# Patient Record
Sex: FEMALE | Race: BLACK OR AFRICAN AMERICAN | Employment: OTHER | URBAN - METROPOLITAN AREA
[De-identification: names, ages, dates, MRNs, and addresses within clinical notes are randomized per-mention and may not be internally consistent; named-entity substitution may affect disease eponyms.]

---

## 2017-01-25 PROBLEM — E78.5 HYPERLIPIDEMIA: Chronic | Status: ACTIVE | Noted: 2017-01-25

## 2017-01-25 PROBLEM — E03.9 ACQUIRED HYPOTHYROIDISM: Chronic | Status: ACTIVE | Noted: 2017-01-25

## 2017-01-25 PROBLEM — M65.341 TRIGGER RING FINGER OF RIGHT HAND: Chronic | Status: ACTIVE | Noted: 2017-01-25

## 2017-01-25 RX ORDER — SODIUM CHLORIDE, SODIUM LACTATE, POTASSIUM CHLORIDE, CALCIUM CHLORIDE 600; 310; 30; 20 MG/100ML; MG/100ML; MG/100ML; MG/100ML
125 INJECTION, SOLUTION INTRAVENOUS CONTINUOUS
Status: CANCELLED | OUTPATIENT
Start: 2017-01-25 | End: 2017-01-26

## 2017-01-25 RX ORDER — SODIUM CHLORIDE 0.9 % (FLUSH) 0.9 %
5-10 SYRINGE (ML) INJECTION AS NEEDED
Status: CANCELLED | OUTPATIENT
Start: 2017-01-25

## 2017-01-25 RX ORDER — SODIUM CHLORIDE 0.9 % (FLUSH) 0.9 %
5-10 SYRINGE (ML) INJECTION EVERY 8 HOURS
Status: CANCELLED | OUTPATIENT
Start: 2017-01-25

## 2017-01-25 NOTE — H&P
History and Physical        Patient: Lawrance Phoenix               Sex: female          DOA: (Not on file)         YOB: 1950      Age:  78 y.o.        LOS:  LOS: 0 days        HPI:   Long Gates is in for followup of her right 2nd and 4th trigger fingers. She did the therapy. She has noticed that it helped some but she still has clicking in the 2nd finger and the 4th finger gives her the most discomfort. AP, lateral, and oblique views of the right hand were obtained and interpreted in the office and show mild DJD. Otherwise, x-rays reveal no periosteal reaction, no medullary lesions, no osteopenia, well-aligned joint spaces, no chondrolysis, and no fractures. Past Medical History   Diagnosis Date    Arthritis     GERD (gastroesophageal reflux disease)     H/O ehrlichiosis 0926    Thyroid disease      hypo       Past Surgical History   Procedure Laterality Date    Hx knee arthroscopy Bilateral     Hx orthopaedic Right      3rd finger trigger finger release    Hx  section      Hx tubal ligation         No family history on file. Social History     Social History    Marital status:      Spouse name: N/A    Number of children: N/A    Years of education: N/A     Social History Main Topics    Smoking status: Not on file    Smokeless tobacco: Not on file    Alcohol use Not on file    Drug use: Not on file    Sexual activity: Not on file     Other Topics Concern    Not on file     Social History Narrative    No narrative on file       Prior to Admission medications    Medication Sig Start Date End Date Taking? Authorizing Provider   ibuprofen (MOTRIN) 200 mg tablet Take 400 mg by mouth every six (6) hours as needed for Pain. Historical Provider   acetaminophen (TYLENOL EXTRA STRENGTH) 500 mg tablet Take 1,000 mg by mouth every six (6) hours as needed for Pain. Historical Provider   multivitamin (ONE A DAY) tablet Take 1 Tab by mouth daily. Historical Provider   CALCIUM CARBONATE/VITAMIN D3 (CALCIUM 600 + D,3, PO) Take 1 Tab by mouth daily. Historical Provider   cholecalciferol (VITAMIN D3) 1,000 unit tablet Take 1,000 Units by mouth daily. Historical Provider   levothyroxine (SYNTHROID) 75 mcg tablet Take 75 mcg by mouth. Sunday, Monday, Tuesday, Thursday, Friday and Saturday    Historical Provider   levothyroxine (SYNTHROID) 75 mcg tablet Take 37.5 mcg by mouth every Wednesday. Historical Provider   rosuvastatin (CRESTOR) 10 mg tablet Take 10 mg by mouth every Monday, Wednesday, Friday. Historical Provider   Omeprazole delayed release (PRILOSEC D/R) 20 mg tablet Take 20 mg by mouth daily as needed. Historical Provider       No Known Allergies    Review of Systems  A comprehensive review of systems was negative except for that written in the History of Present Illness. Physical Exam:      There were no vitals taken for this visit. Physical Exam:  On exam the right hand has pain at the 2nd and 4th finger A1 pulley and there is obvious locking of both fingers. Her 3rd finger Dr. Teofilo Gonzales did a release on before and that is moving normally. Physical examination shows that the patients right wrist demonstrates no obvious swelling, ecchymosis, or wounds noted. The patient has normal motion in all six directions. The patient has a negative Tinel, Phalen, and direct carpal compression tests. The patient has a negative Finkelstein maneuver. The patient has a 2+ radial pulse. The patient has good  strength of the hand with normal thenar eminence tone and normal light-touch sensation at the tip of each digit. Assessment/Plan     Principal Problem:    Trigger ring finger of right hand (1/25/2017)    Active Problems:    Hyperlipidemia (1/25/2017)      Acquired hypothyroidism (1/25/2017)        Dr. Teofilo Gonzales discussed her trigger fingers at length.   He talked about surgical release at some point because he has already done her right 3rd finger. Her 4th finger is the worst and he told her we can do that whenever she likes. She is going to give this consideration and call back when she is ready to do this. Once her 4th finger is better we will discuss surgery on her 2nd finger as appropriate. We could do this on a Thursday and she could return to work on a Monday. He has not given her any pain medicine. She will call [facility]' surgical scheduler at the appropriate interval to get this set up.

## 2017-01-26 ENCOUNTER — HOSPITAL ENCOUNTER (OUTPATIENT)
Age: 67
Setting detail: OUTPATIENT SURGERY
Discharge: HOME | End: 2017-01-26
Attending: ORTHOPAEDIC SURGERY | Admitting: ORTHOPAEDIC SURGERY
Payer: MEDICARE

## 2017-01-26 ENCOUNTER — SURGERY (OUTPATIENT)
Age: 67
End: 2017-01-26

## 2017-01-26 VITALS
OXYGEN SATURATION: 100 % | DIASTOLIC BLOOD PRESSURE: 62 MMHG | TEMPERATURE: 98.4 F | WEIGHT: 167.13 LBS | HEART RATE: 63 BPM | BODY MASS INDEX: 27.85 KG/M2 | HEIGHT: 65 IN | RESPIRATION RATE: 16 BRPM | SYSTOLIC BLOOD PRESSURE: 125 MMHG

## 2017-01-26 PROCEDURE — 77030020782 HC GWN BAIR PAWS FLX 3M -B: Performed by: ORTHOPAEDIC SURGERY

## 2017-01-26 PROCEDURE — 76010000154 HC OR TIME FIRST 0.5 HR: Performed by: ORTHOPAEDIC SURGERY

## 2017-01-26 PROCEDURE — 74011000250 HC RX REV CODE- 250: Performed by: ORTHOPAEDIC SURGERY

## 2017-01-26 PROCEDURE — 77030011640 HC PAD GRND REM COVD -A: Performed by: ORTHOPAEDIC SURGERY

## 2017-01-26 PROCEDURE — 76210000021 HC REC RM PH II 0.5 TO 1 HR: Performed by: ORTHOPAEDIC SURGERY

## 2017-01-26 RX ORDER — DIPHENHYDRAMINE HCL 25 MG
25 CAPSULE ORAL
Status: DISCONTINUED | OUTPATIENT
Start: 2017-01-26 | End: 2017-01-26 | Stop reason: HOSPADM

## 2017-01-26 RX ORDER — SODIUM CHLORIDE 0.9 % (FLUSH) 0.9 %
5-10 SYRINGE (ML) INJECTION AS NEEDED
Status: DISCONTINUED | OUTPATIENT
Start: 2017-01-26 | End: 2017-01-26 | Stop reason: HOSPADM

## 2017-01-26 RX ORDER — SODIUM CHLORIDE 0.9 % (FLUSH) 0.9 %
5-10 SYRINGE (ML) INJECTION EVERY 8 HOURS
Status: DISCONTINUED | OUTPATIENT
Start: 2017-01-26 | End: 2017-01-26 | Stop reason: HOSPADM

## 2017-01-26 RX ORDER — HYDROCODONE BITARTRATE AND ACETAMINOPHEN 5; 325 MG/1; MG/1
1-2 TABLET ORAL
Qty: 30 TAB | Refills: 0 | Status: SHIPPED | OUTPATIENT
Start: 2017-01-26 | End: 2021-10-07

## 2017-01-26 RX ORDER — BUPIVACAINE HYDROCHLORIDE 2.5 MG/ML
INJECTION, SOLUTION EPIDURAL; INFILTRATION; INTRACAUDAL AS NEEDED
Status: DISCONTINUED | OUTPATIENT
Start: 2017-01-26 | End: 2017-01-26 | Stop reason: HOSPADM

## 2017-01-26 RX ADMIN — BUPIVACAINE HYDROCHLORIDE 8 ML: 2.5 INJECTION, SOLUTION EPIDURAL; INFILTRATION; INTRACAUDAL; PERINEURAL at 11:56

## 2017-01-26 NOTE — DISCHARGE INSTRUCTIONS
DISCHARGE SUMMARY from Nurse    The following personal items are in your possession at time of discharge:    Dental Appliances: None  Visual Aid: Glasses, Sent home     Home Medications: None  Jewelry: Earrings (with Concepcion Billet)  Clothing: Pants, Shirt, Undergarments, Footwear, Socks, Jacket/Coat (in locker 4)  Other Valuables: Eyeglasses, Cell Phone (with Concepcion Billet)             PATIENT INSTRUCTIONS:    After general anesthesia or intravenous sedation, for 24 hours or while taking prescription Narcotics:  · Limit your activities  · Do not drive and operate hazardous machinery  · Do not make important personal or business decisions  · Do  not drink alcoholic beverages  · If you have not urinated within 8 hours after discharge, please contact your surgeon on call. Report the following to your surgeon:  · Excessive pain, swelling, redness or odor of or around the surgical area  · Temperature over 100.5  · Nausea and vomiting lasting longer than 4 hours or if unable to take medications  · Any signs of decreased circulation or nerve impairment to extremity: change in color, persistent  numbness, tingling, coldness or increase pain  · Any questions        What to do at Home:  Recommended activity: Activity as tolerated, no lifting with operative hand, follow Dr. Vane Mendez instructions in this packet    If you experience any of the following symptoms temperature above 101.0, pain not controlled by medication, numbness or tingling of hand please follow up with Dr. Vane Mendez. *  Please give a list of your current medications to your Primary Care Provider. *  Please update this list whenever your medications are discontinued, doses are      changed, or new medications (including over-the-counter products) are added. *  Please carry medication information at all times in case of emergency situations.           These are general instructions for a healthy lifestyle:    No smoking/ No tobacco products/ Avoid exposure to second hand smoke    Surgeon General's Warning:  Quitting smoking now greatly reduces serious risk to your health. Obesity, smoking, and sedentary lifestyle greatly increases your risk for illness    A healthy diet, regular physical exercise & weight monitoring are important for maintaining a healthy lifestyle    You may be retaining fluid if you have a history of heart failure or if you experience any of the following symptoms:  Weight gain of 3 pounds or more overnight or 5 pounds in a week, increased swelling in our hands or feet or shortness of breath while lying flat in bed. Please call your doctor as soon as you notice any of these symptoms; do not wait until your next office visit. Recognize signs and symptoms of STROKE:    F-face looks uneven    A-arms unable to move or move unevenly    S-speech slurred or non-existent    T-time-call 911 as soon as signs and symptoms begin-DO NOT go       Back to bed or wait to see if you get better-TIME IS BRAIN. Warning Signs of HEART ATTACK     Call 911 if you have these symptoms:   Chest discomfort. Most heart attacks involve discomfort in the center of the chest that lasts more than a few minutes, or that goes away and comes back. It can feel like uncomfortable pressure, squeezing, fullness, or pain.  Discomfort in other areas of the upper body. Symptoms can include pain or discomfort in one or both arms, the back, neck, jaw, or stomach.  Shortness of breath with or without chest discomfort.  Other signs may include breaking out in a cold sweat, nausea, or lightheadedness. Don't wait more than five minutes to call 911 - MINUTES MATTER! Fast action can save your life. Calling 911 is almost always the fastest way to get lifesaving treatment. Emergency Medical Services staff can begin treatment when they arrive -- up to an hour sooner than if someone gets to the hospital by car. The discharge information has been reviewed with the patient and caregiver. The patient and caregiver verbalized understanding. Discharge medications reviewed with the patient and caregiver and appropriate educational materials and side effects teaching were provided. Patient armband removed and shredded                Odin Melendez III, MD Marye Coombe, PA-C    Upper Extremity Surgery  Discharge Instructions      Please take the time to review the following instructions before you leave the hospital and use them as guidelines during your recovery from surgery. If you have any questions you may contact my office at [phone]. Wound Care/Dressing Changes:    []   You may remove the bulky dressing two days after surgery. Once you remove this, no dressing is necessary if there is no drainage. [x]   You may change your dressing as needed. Beginning the 2 days after you are discharged from the hospital you should change your dressing daily. A big, bulky dressing isnt necessary as long as there is any drainage from the incisions. You can put a band-aid or a piece of gauze over each incision and wear an ACE bandage as needed for comfort and swelling. []   Dont remove your dressing or get them wet.  It isnt necessary to apply antibiotic ointment to your incisions. Sutures will be removed at your one week post-op visit. Staples (if you have them) are removed in two weeks. If you have steri-strips over your incision they will start to peel off in 7-10 days as you get them wet. They dont need to be removed prior to that. When they begin to peel off, you may remove them. They should all be removed by 14 days from your surgery. Showering/Bathing:    [x]   You may shower 2 days after your surgery. Your dressing may be removed for showering. You may get your incisions wet in the shower. Dont vigorously scrub your incisions. Apply a clean, dry dressing after you have dried your incisions.   Do not take a bath or get into a swimming pool or Jacuzzi until the incisions are completely healed. This may take about 14 days. Do not soak your incision under water. Sling:    []   You are not required to wear your sling and should do so only as needed for comfort. You have no restrictions with regards to the movement of your shoulder. Please push to achieve full range of motion as soon as possible. You may resume your normal daily activities immediately and return to work as soon as you feel appropriate.    []   Keep your arm in the immobilizer at all times except when showering and changing your clothes. When showering or changing, keep your arm at your side. Dont move it away from your body. []   Keep your arm in the immobilizer at all times except when showering, changing your clothes and doing the exercises shown to you by Dr. Teofilo Gonzales or your physical therapist prior to your discharge from the hospital.  Keep your arm at your side when changing your clothes and showering. Dont move it away from your body. Ice/Elevation    Continue ice consistently for 48 hours after surgery. After 48 hours, you should ice your shoulder 3 times per day, for 20 minutes at a time for the next 5 days. After one week from surgery, you may use ice as needed for pain and swelling. Diet:    You may advance to your regular diet as tolerated. Medication:    1. You will be given a prescription for pain medication when you are discharged from the hospital.  Take the medication as needed according to the directions on the prescription bottle. Possible side effects of the medication include dizziness, headache, nausea, vomiting, constipation and urinary retention. If you experience any of these side effects call the office so that we can assist you in relieving them. Discontinue the use of the pain medication if you develop itching, rash, shortness of breath or difficulties swallowing.   If these symptoms become severe or arent relieved by discontinuing the medication you should seek immediate medical attention. Refills of pain medication are authorized during office hours only (8 AM-5PM Mon. thru Fri.). 2. If you were prescribed Percoset/oxycodone or Dilaudid/hydromorphone you must have a written prescription. These medications legally cannot be called in to the pharmacy. 3. You may take over the counter Ibuprofen/Advil/Aleve between dosages of your pain medication if needed. Do not take Tylenol in addition to your pain medication as most of the pain medication already contains Tylenol. Do not exceed 3000mg of Tylenol per day. Ex: (hydrocodone 5/325g= 325mg of Tylenol)  4. You may resume the medication you were taking prior to surgery. Pain medication may change the effects of any antidepressant medication you are taking. If you have any questions about possible interactions between your regular medications and the pain medication you should consult the physician who prescribes your regular medications. Follow Up Appointment:  If you are unsure of your follow-up appointment date and time, please call [phone]. Please let our  know you are scheduling a post-op appointment. Most appointments should be between 7-14 days after your surgery. Physical Therapy:    []    If you already have a therapy appointment, please be sure to attend your sessions as scheduled. []   Physical Therapy will be discussed with you at your first follow-up appointment with Dr. Ramiro Lujan. You dont need to begin physical therapy prior to that.    []  Begin physical therapy with your Home Health Physical Therapy. This will be set up         for your before you leave the hospital.    [x]  You do not require Physical Therapy. Important Signs and Symptoms:    If any of the following signs and symptoms occurs, you should contact Dr. Ramiro Lujan office.   Please be advised if a problem arises which you feel requires immediate medical attention or you are unable to contact Dr. Ramiro Lujan office you should seek immediate medical attention. Signs and symptoms to watch for include:    1. A sudden increase in swelling and/or redness or warmth at the area your surgery was performed which isnt relieved by rest, ice, and elevation. 2. Oral temperature greater than 101 degrees for 12 hours or more which isnt relieved by an increase in fluid intake and taking two Tylenol every 4-6 hours. 3. Excessive drainage from your incisions, or drainage which hasnt stopped by 72 hours after your surgery. 4. Fever, chills, shortness of breath, chest pain, nausea, vomiting or other signs and symptoms which are of concern to you.

## 2017-01-26 NOTE — INTERVAL H&P NOTE
H&P Update:  Alejo Castillo was seen and examined. History and physical has been reviewed. The patient has been examined. There have been no significant clinical changes since the completion of the originally dated History and Physical.  Patient identified by surgeon; surgical site was confirmed by patient and surgeon.     Signed By: Ashley Schultz MD     January 26, 2017 11:16 AM

## 2017-01-26 NOTE — OP NOTES
TRIGGER FINGER RELEASE OPERATIVE NOTE    Patient: Robbin Shone MRN: [id number]  SSN: [id number]    YOB: 1950  Age: 78 y.o. Sex: female          Date of Procedure: 1/26/2017     Preoperative Diagnosis: TRIGGER FINGER    Postoperative Diagnosis: TRIGGER FINGER      Procedure: Procedure(s):  RIGHT 4TH TRIGGER FINGER RELEASE **LOCAL DO NOT START IV**    Surgeon:  Surgeon(s) and Role:     * Keith Villanueva MD - Primary    Anesthesia: Local    Estimated Blood Loss: Minimal     Tourniquet Time:   Approximately 8 minutes at 250mm Hg. Specimens: None    Complications: None    Indications: This is a 78y.o. year-old female who presents with the surgically consented known trigger finger(s) and  who now presents for surgical correction due to inability to correct the patients problems conservatively with cortisone injections. Procedure: The patient was brought in the operating theater and after adequate anesthesia the correct hand/fingers  was prepped and draped in the typical sterile fashion. The limb was exsanguinated with an Esmarch bandage and the tourniquet was inflated to 250mm Hg. The surgically consented trigger finger(s) A1 pulley was anesthetized with approximately 5 mL 0.25% Marcaine plain. The curvilinear incision was then made in the flexion crease, centering it over the flexor tendon. The incision was taken down to the sheath, which was identified and then the A1 pulley was released from proximal to distal under direct visualization. Once it was released completely, the finger could be flexed and extended without locking. The skin was then closed with two horizontal 4-0 nylons and dressed with 4 x 4s and an Ace wrap and the patient returned to the recovery room awake, in stable condition. All instrument, sponge and needle counts were correct. This was the same procedure for each trigger finger as consented.             Keith Villanueva MD  1/26/2017  12:04 PM

## 2017-01-26 NOTE — IP AVS SNAPSHOT
[address] [place] [id number] Patient: Kait De La Cruz MRN: [id number] YTF:81/28/2013 You are allergic to the following No active allergies Recent Documentation Height Weight BMI OB Status Smoking Status 1.651 m 75.8 kg 27.81 kg/m2 Hysterectomy Unknown If Ever Smoked Emergency Contacts Name Discharge Info Relation Home Work Mobile Fonda Goldmann  Spouse [3]   [phone] About your hospitalization You were admitted on:  January 26, 2017 [facility] You were discharged on:  January 26, 2017 Unit phone number:    
  
Why you were hospitalized Your primary diagnosis was:  Trigger Ring Finger Of Right Hand Your diagnoses also included:  Hyperlipidemia, Acquired Hypothyroidism Providers Seen During Your Hospitalizations Provider Role Specialty Primary office phone James Castillo MD Attending Provider Orthopedic Surgery [phone] Your Primary Care Physician (PCP) Primary Care Physician Office Phone Office Fax Barbra Adlersherrie [phone] Follow-up Information Follow up With Details Comments Contact Info Chaitanya Farfan MD   [address] [place] [address] 
[phone] James Castillo MD Follow up in 10 day(s)  [address] [address] 
[phone] Current Discharge Medication List  
  
START taking these medications Dose & Instructions Dispensing Information Comments Morning Noon Evening Bedtime HYDROcodone-acetaminophen 5-325 mg per tablet Commonly known as:  Thenatalie [place] Your next dose is: Today, Tomorrow Other:  _________ Dose:  1-2 Tab Take 1-2 Tabs by mouth every four (4) hours as needed for Pain. Max Daily Amount: 12 Tabs. Quantity:  30 Tab Refills:  0 CONTINUE these medications which have NOT CHANGED Dose & Instructions Dispensing Information Comments Morning Noon Evening Bedtime CALCIUM 600 + D(3) PO Your next dose is: Today, Tomorrow Other:  _________ Dose:  1 Tab Take 1 Tab by mouth daily. Refills:  0  
     
   
   
   
  
 CRESTOR 10 mg tablet Generic drug:  rosuvastatin Your next dose is: Today, Tomorrow Other:  _________ Dose:  10 mg Take 10 mg by mouth every Monday, Wednesday, Friday. Refills:  0  
     
   
   
   
  
 ibuprofen 200 mg tablet Commonly known as:  MOTRIN Your next dose is: Today, Tomorrow Other:  _________ Dose:  400 mg Take 400 mg by mouth every six (6) hours as needed for Pain. Refills:  0  
     
   
   
   
  
 multivitamin tablet Commonly known as:  ONE A DAY Your next dose is: Today, Tomorrow Other:  _________ Dose:  1 Tab Take 1 Tab by mouth daily. Refills:  0 Omeprazole delayed release 20 mg tablet Commonly known as:  PRILOSEC D/R Your next dose is: Today, Tomorrow Other:  _________ Dose:  20 mg Take 20 mg by mouth daily as needed. Refills:  0  
     
   
   
   
  
 * SYNTHROID 75 mcg tablet Generic drug:  levothyroxine Your next dose is: Today, Tomorrow Other:  _________ Dose:  75 mcg Take 75 mcg by mouth. Sunday, Monday, Tuesday, Thursday, Friday and Saturday Refills:  0  
     
   
   
   
  
 * SYNTHROID 75 mcg tablet Generic drug:  levothyroxine Your next dose is: Today, Tomorrow Other:  _________ Dose:  37.5 mcg Take 37.5 mcg by mouth every Wednesday. Refills:  0  
     
   
   
   
  
 VITAMIN D3 1,000 unit tablet Generic drug:  cholecalciferol Your next dose is: Today, Tomorrow Other:  _________ Dose:  1000 Units Take 1,000 Units by mouth daily. Refills:  0  
     
   
   
   
  
 * Notice: This list has 2 medication(s) that are the same as other medications prescribed for you. Read the directions carefully, and ask your doctor or other care provider to review them with you. STOP taking these medications TYLENOL EXTRA STRENGTH 500 mg tablet Generic drug:  acetaminophen Where to Get Your Medications Information on where to get these meds will be given to you by the nurse or doctor. ! Ask your nurse or doctor about these medications HYDROcodone-acetaminophen 5-325 mg per tablet Discharge Instructions DISCHARGE SUMMARY from Nurse The following personal items are in your possession at time of discharge: 
 
Dental Appliances: None Visual Aid: Glasses, Sent home Home Medications: None Jewelry: Earrings (with Adryan Slack) Clothing: Pants, Shirt, Undergarments, Footwear, Socks, Jacket/Coat (in locker 4) Other Valuables: Eyeglasses, Cell Phone (with Adryan Slack) PATIENT INSTRUCTIONS: 
 
 
F-face looks uneven A-arms unable to move or move unevenly S-speech slurred or non-existent T-time-call 911 as soon as signs and symptoms begin-DO NOT go Back to bed or wait to see if you get better-TIME IS BRAIN. Warning Signs of HEART ATTACK Call 911 if you have these symptoms: 
? Chest discomfort. Most heart attacks involve discomfort in the center of the chest that lasts more than a few minutes, or that goes away and comes back. It can feel like uncomfortable pressure, squeezing, fullness, or pain. ? Discomfort in other areas of the upper body. Symptoms can include pain or discomfort in one or both arms, the back, neck, jaw, or stomach. ? Shortness of breath with or without chest discomfort. ? Other signs may include breaking out in a cold sweat, nausea, or lightheadedness. Don't wait more than five minutes to call [address]! Fast action can save your life. Calling 911 is almost always the fastest way to get lifesaving treatment. Emergency Medical Services staff can begin treatment when they arrive  up to an hour sooner than if someone gets to the hospital by car. The discharge information has been reviewed with the patient and caregiver. The patient and caregiver verbalized understanding. Discharge medications reviewed with the patient and caregiver and appropriate educational materials and side effects teaching were provided. Patient armband removed and shredded Nai Muse III, MD Fermin Bast, PA-C Upper Extremity Surgery Discharge Instructions Please take the time to review the following instructions before you leave the hospital and use them as guidelines during your recovery from surgery. If you have any questions you may contact my office at [phone]. Wound Care/Dressing Changes: You may remove the bulky dressing two days after surgery. Once you remove this, no dressing is necessary if there is no drainage. You may change your dressing as needed. Beginning the 2 days after you are discharged from the hospital you should change your dressing daily. A big, bulky dressing isnt necessary as long as there is any drainage from the incisions. You can put a band-aid or a piece of gauze over each incision and wear an ACE bandage as needed for comfort and swelling. Dont remove your dressing or get them wet. ? It isnt necessary to apply antibiotic ointment to your incisions. Sutures will be removed at your one week post-op visit. Staples (if you have them) are removed in two weeks. If you have steri-strips over your incision they will start to peel off in 7-10 days as you get them wet. They dont need to be removed prior to that. When they begin to peel off, you may remove them. They should all be removed by 14 days from your surgery. Showering/Bathing: You may shower 2 days after your surgery. Your dressing may be removed for showering. You may get your incisions wet in the shower. Dont vigorously scrub your incisions. Apply a clean, dry dressing after you have dried your incisions. Do not take a bath or get into a swimming pool or Jacuzzi until the incisions are completely healed. This may take about 14 days. Do not soak your incision under water. Sling: You are not required to wear your sling and should do so only as needed for comfort. You have no restrictions with regards to the movement of your shoulder. Please push to achieve full range of motion as soon as possible. You may resume your normal daily activities immediately and return to work as soon as you feel appropriate. Keep your arm in the immobilizer at all times except when showering and changing your clothes. When showering or changing, keep your arm at your side. Dont move it away from your body. Keep your arm in the immobilizer at all times except when showering, changing your clothes and doing the exercises shown to you by Dr. Surinder Mccoy or your physical therapist prior to your discharge from the hospital.  Keep your arm at your side when changing your clothes and showering. Dont move it away from your body. Ice/Elevation Continue ice consistently for 48 hours after surgery. After 48 hours, you should ice your shoulder 3 times per day, for 20 minutes at a time for the next 5 days. After one week from surgery, you may use ice as needed for pain and swelling. Diet: 
 
You may advance to your regular diet as tolerated. Medication: 
 
1. You will be given a prescription for pain medication when you are discharged from the hospital.  Take the medication as needed according to the directions on the prescription bottle. Possible side effects of the medication include dizziness, headache, nausea, vomiting, constipation and urinary retention. If you experience any of these side effects call the office so that we can assist you in relieving them. Discontinue the use of the pain medication if you develop itching, rash, shortness of breath or difficulties swallowing. If these symptoms become severe or arent relieved by discontinuing the medication you should seek immediate medical attention. Refills of pain medication are authorized during office hours only (8 AM-5PM Mon. thru Fri.). 2. If you were prescribed Percoset/oxycodone or Dilaudid/hydromorphone you must have a written prescription. These medications legally cannot be called in to the pharmacy. 3. You may take over the counter Ibuprofen/Advil/Aleve between dosages of your pain medication if needed. Do not take Tylenol in addition to your pain medication as most of the pain medication already contains Tylenol. Do not exceed 3000mg of Tylenol per day. Ex: (hydrocodone 5/325g= 325mg of Tylenol) 4. You may resume the medication you were taking prior to surgery. Pain medication may change the effects of any antidepressant medication you are taking. If you have any questions about possible interactions between your regular medications and the pain medication you should consult the physician who prescribes your regular medications. Follow Up Appointment: If you are unsure of your follow-up appointment date and time, please call [phone]. Please let our  know you are scheduling a post-op appointment. Most appointments should be between 7-14 days after your surgery. Physical Therapy: If you already have a therapy appointment, please be sure to attend your sessions as scheduled. Physical Therapy will be discussed with you at your first follow-up appointment with Dr. Mira Can. You dont need to begin physical therapy prior to that. Begin physical therapy with your Home Health Physical Therapy. This will be set up         for your before you leave the hospital. 
 
  You do not require Physical Therapy. Important Signs and Symptoms: 
 
If any of the following signs and symptoms occurs, you should contact Dr. Mira Can office. Please be advised if a problem arises which you feel requires immediate medical attention or you are unable to contact Dr. Mira Can office you should seek immediate medical attention. Signs and symptoms to watch for include: 1. A sudden increase in swelling and/or redness or warmth at the area your surgery was performed which isnt relieved by rest, ice, and elevation. 2. Oral temperature greater than 101 degrees for 12 hours or more which isnt relieved by an increase in fluid intake and taking two Tylenol every 4-6 hours. 3. Excessive drainage from your incisions, or drainage which hasnt stopped by 72 hours after your surgery. 4. Fever, chills, shortness of breath, chest pain, nausea, vomiting or other signs and symptoms which are of concern to you. Discharge Orders None Introducing [facility]! Prosper Martínez introduces [facility] patient portal. Now you can access parts of your medical record, email your doctor's office, and request medication refills online. 1. In your internet browser, go to [email or web address] 2. Click on the First Time User? Click Here link in the Sign In box. You will see the New Member Sign Up page. 3. Enter your [facility] Access Code exactly as it appears below. You will not need to use this code after youve completed the sign-up process.  If you do not sign up before the expiration date, you must request a new code. · [employer] Access Code: [email or web address] Expires: 4/6/2017  6:34 PM 
 
4. Enter the last four digits of your Social Security Number (xxxx) and Date of Birth (mm/dd/yyyy) as indicated and click Submit. You will be taken to the next sign-up page. 5. Create a [employer] ID. This will be your [employer] login ID and cannot be changed, so think of one that is secure and easy to remember. 6. Create a [facility] password. You can change your password at any time. 7. Enter your Password Reset Question and Answer. This can be used at a later time if you forget your password. 8. Enter your e-mail address. You will receive e-mail notification when new information is available in [address] 9. Click Sign Up. You can now view and download portions of your medical record. 10. Click the Download Summary menu link to download a portable copy of your medical information. If you have questions, please visit the Frequently Asked Questions section of the [employer] website. Remember, [employer] is NOT to be used for urgent needs. For medical emergencies, dial 911. Now available from your iPhone and Android! General Information Please provide this summary of care documentation to your next provider. Patient Signature:  ____________________________________________________________ Date:  ____________________________________________________________  
  
Martínez Laughlin Provider Signature:  ____________________________________________________________ Date:  ____________________________________________________________

## 2017-06-19 PROBLEM — M65.321 TRIGGER INDEX FINGER OF RIGHT HAND: Chronic | Status: ACTIVE | Noted: 2017-06-19

## 2020-07-07 NOTE — PROGRESS NOTES
[facility]  [address] [place] [address], [address], [address] [facility]  [address] [facility], [address] [place] [address]  [place], [address]   [phone]  Cathlyn Fothergill DO      Patient ID:  Name:  Ailyn Luna  MRN:  [id number]  :  1950/69 y.o. Date:  2020      HISTORY OF PRESENT ILLNESS:  Ailyn Luna is a 78 y.o.   postmenopausal female referred by Dr. Scott Montero for persistent left hydrosalpinx. Pt has been followed for persistent hydrosalpinx. Pt initially had pelvic pain leading to ultrasound. Has been being followed by ultrasound since 2018. She presents today for further management. Currently, Denies Vaginal bleeding, change in vaginal discharge, new abdominopelvic pain, change in bladder/bowel habits          Labs:  : per patient was normal      Imaging:  Ultrasound left fallopian tube  2020: 1.8 x1.2 x 2.0 cm  2019: 1.6 x0.9 x1.4 cm  2018: 1.7 x0.9 x 1.5 cm       ROS:   As above      There are no active problems to display for this patient. History reviewed. No pertinent past medical history. Past Surgical History:   Procedure Laterality Date    HX GYN      C Section     HX GYN      tubal ligation     HX ORTHOPAEDIC      Ligament surgery     HX ORTHOPAEDIC      finger surgery       OB History        2    Para   2    Term                AB        Living   2       SAB        TAB        Ectopic        Molar        Multiple        Live Births              Obstetric Comments   2 C sections            Social History     Tobacco Use    Smoking status: Former Smoker     Last attempt to quit:      Years since quittin.5    Smokeless tobacco: Never Used   Substance Use Topics    Alcohol use:  Yes     Alcohol/week: 1.0 standard drinks     Types: 1 Glasses of wine per week      Family History   Problem Relation Age of Onset    Cancer Brother         Prostate      Current Outpatient Medications   Medication Sig    Synthroid 75 mcg tablet TAKE ONE TABLET BY MOUTH ONCE DAILY EXCEPT ON WEDNESDAY AND SATURDAY TAKE HALF A TABLET    rosuvastatin (CRESTOR) 10 mg tablet TAKE 1 TABLET BY MOUTH 4 TIMES A WEEK AT BEDTIME    omeprazole (PriLOSEC OTC) 20 mg tablet Take 20 mg by mouth daily.  naproxen (NAPROSYN) 500 mg tablet TK 1 T PO  BID PRN FOR PAIN     No current facility-administered medications for this visit. Allergies   Allergen Reactions    Tramadol Nausea and Vomiting          OBJECTIVE:    Physical Exam  VITAL SIGNS: Visit Vitals  /85 (BP 1 Location: Right arm, BP Patient Position: Sitting)   Pulse 73   Temp 96.9 °F (36.1 °C) (Oral)   Resp 14   Ht 5' 5\" (1.651 m)   Wt 73 kg (161 lb)   SpO2 97%   BMI 26.79 kg/m²      GENERAL FAYE: in no apparent distress and well developed and well nourished   MUSCULOSKEL: no joint tenderness, deformity or swelling   INTEGUMENT:  warm and dry, no rashes or lesions   ABDOMEN . soft, NT, ND, No masses appreciated   EXTREMITIES: extremities normal, atraumatic, no cyanosis or edema   PELVIC: External genitalia: normal general appearance  Vaginal: atrophic mucosa  Cervix: normal appearance  Adnexa: fullness in left adnexa  Uterus: normal single, nontender   RECTAL: deferred   AGUSTIN SURVEY: Cervical, supraclavicular, axillary and inguinal nodes normal.   NEURO: Grossly normal         IMPRESSION/PLAN:  1. Persistent left hydrosalpinx   -reviewed imaging and essentially stable. Can not say with 554% certainty that not a cancerous process but given minimal change it is unlikely   -discussed definitive diagnosis would involve surgery including laparoscopic bso.    -pt would like proceed with surveillance.  Will plan for repeat ultrasound and f/u in 1 year   -pt to call sooner if pain comes back     The total time spent was 60 minutes regarding this patients diagnosis of hydrosalpinx and >50% of this time was spent counseling and coordinating care    [facility]  7/24/20202:59 PM

## 2020-07-24 ENCOUNTER — OFFICE VISIT (OUTPATIENT)
Dept: ONCOLOGY | Age: 70
End: 2020-07-24

## 2020-07-24 VITALS
SYSTOLIC BLOOD PRESSURE: 140 MMHG | WEIGHT: 161 LBS | OXYGEN SATURATION: 97 % | HEART RATE: 73 BPM | BODY MASS INDEX: 26.82 KG/M2 | RESPIRATION RATE: 14 BRPM | HEIGHT: 65 IN | TEMPERATURE: 96.9 F | DIASTOLIC BLOOD PRESSURE: 85 MMHG

## 2020-07-24 DIAGNOSIS — N70.11 HYDROSALPINX: Primary | ICD-10-CM

## 2020-07-24 RX ORDER — LEVOTHYROXINE SODIUM 75 UG/1
TABLET ORAL
COMMUNITY
Start: 2020-06-23 | End: 2021-10-07

## 2020-07-24 RX ORDER — NAPROXEN 500 MG/1
TABLET ORAL
COMMUNITY
Start: 2020-06-08 | End: 2021-10-07

## 2020-07-24 RX ORDER — ROSUVASTATIN CALCIUM 10 MG/1
10 TABLET, COATED ORAL
COMMUNITY
Start: 2020-06-20

## 2020-07-24 RX ORDER — OMEPRAZOLE 20 MG/1
20 TABLET, DELAYED RELEASE ORAL DAILY
COMMUNITY

## 2020-07-24 NOTE — PROGRESS NOTES
Harrison Alcantar is a 78 y.o. female presents in office for Dr. Referred by Dr. Song Davis    Chief Complaint   Patient presents with   [facility] New Patient     referred by Dr. Abisai Jain      Patient states has had a  test that came back negative. Patient states has a Hx of fluid in fallopian tube on left side. Patient states has occasional cramping. Patient states have Hx of tubal ligation. Patient states has Hx of 2 pregnancies delivery by . Do you have any unusual vaginal bleeding, discharge or irritation? No     Do you have any changes in your bowel movements? No     Have you been experiencing any continuous or worsening abdominal pain? No     .  Visit Vitals  /85 (BP 1 Location: Right arm, BP Patient Position: Sitting)   Pulse 73   Temp 96.9 °F (36.1 °C) (Oral)   Resp 14   Ht 5' 5\" (1.651 m)   Wt 161 lb (73 kg)   SpO2 97%   BMI 26.79 kg/m²         Health Maintenance Due   Topic Date Due    Hepatitis C Screening  1950    Lipid Screen  11/10/1959    DTaP/Tdap/Td series (1 - Tdap) 11/10/1970    Shingrix Vaccine Age 50> (1 of 2) 11/10/1999    Breast Cancer Screen Mammogram  11/10/1999    FOBT Q1Y Age 50-75  11/10/1999    GLAUCOMA SCREENING Q2Y  11/10/2014    Bone Densitometry (Dexa) Screening  11/10/2014    Pneumococcal 65+ years (1 of 1 - PPSV23) 11/10/2014         1. Have you been to the ER, urgent care clinic since your last visit? Hospitalized since your last visit? No     2. Have you seen or consulted any other health care providers outside of the [address] [place] [address] since your last visit? Include any pap smears or colon screening. Dr. Song Davis    3 most recent [facility] Screens 2020   Little interest or pleasure in doing things Not at all   Feeling down, depressed, irritable, or hopeless Not at all   Total Score PHQ 2 0       Fall Risk Assessment, last 12 mths 2020   Able to walk? Yes   Fall in past 12 months?  No

## 2020-07-24 NOTE — LETTER
7/24/20 Patient: Josephine Jain YOB: 1950 Date of Visit: 7/24/2020 Mariana Rothman, [address] [facility] [address] VIA Facsimile: [phone] Dear Mariana Rothman DO, Thank you for referring Ms. Josephine Jain to [facility] for evaluation. My notes for this consultation are attached. If you have questions, please do not hesitate to call me. I look forward to following your patient along with you. Sincerely, Lisseth Meneses MD

## 2021-06-22 ENCOUNTER — OFFICE VISIT (OUTPATIENT)
Dept: ONCOLOGY | Age: 71
End: 2021-06-22
Payer: MEDICARE

## 2021-06-22 VITALS
TEMPERATURE: 96.7 F | HEIGHT: 65 IN | DIASTOLIC BLOOD PRESSURE: 71 MMHG | SYSTOLIC BLOOD PRESSURE: 132 MMHG | BODY MASS INDEX: 27.02 KG/M2 | OXYGEN SATURATION: 98 % | WEIGHT: 162.2 LBS | HEART RATE: 75 BPM

## 2021-06-22 DIAGNOSIS — Z01.818 PREOPERATIVE TESTING: Primary | ICD-10-CM

## 2021-06-22 DIAGNOSIS — N70.11 HYDROSALPINX: ICD-10-CM

## 2021-06-22 PROCEDURE — G8419 CALC BMI OUT NRM PARAM NOF/U: HCPCS | Performed by: OBSTETRICS & GYNECOLOGY

## 2021-06-22 PROCEDURE — 99213 OFFICE O/P EST LOW 20 MIN: CPT | Performed by: OBSTETRICS & GYNECOLOGY

## 2021-06-22 PROCEDURE — 1101F PT FALLS ASSESS-DOCD LE1/YR: CPT | Performed by: OBSTETRICS & GYNECOLOGY

## 2021-06-22 PROCEDURE — G8400 PT W/DXA NO RESULTS DOC: HCPCS | Performed by: OBSTETRICS & GYNECOLOGY

## 2021-06-22 PROCEDURE — G8536 NO DOC ELDER MAL SCRN: HCPCS | Performed by: OBSTETRICS & GYNECOLOGY

## 2021-06-22 PROCEDURE — 1090F PRES/ABSN URINE INCON ASSESS: CPT | Performed by: OBSTETRICS & GYNECOLOGY

## 2021-06-22 PROCEDURE — G8510 SCR DEP NEG, NO PLAN REQD: HCPCS | Performed by: OBSTETRICS & GYNECOLOGY

## 2021-06-22 PROCEDURE — 3017F COLORECTAL CA SCREEN DOC REV: CPT | Performed by: OBSTETRICS & GYNECOLOGY

## 2021-06-22 PROCEDURE — G8427 DOCREV CUR MEDS BY ELIG CLIN: HCPCS | Performed by: OBSTETRICS & GYNECOLOGY

## 2021-06-22 PROCEDURE — G0463 HOSPITAL OUTPT CLINIC VISIT: HCPCS | Performed by: OBSTETRICS & GYNECOLOGY

## 2021-06-22 NOTE — PROGRESS NOTES
Diaz Torres is a 69 y.o. female presents in office for 1 year f/u. Referral Dr. Paulo De Jesus. Chief Complaint   Patient presents with    Follow-up        Do you have any unusual vaginal bleeding, discharge or irritation? no  Do you have any changes in your bowel movements? no  Have you been experiencing nausea or vomiting? no  Have you been experiencing any continuous or worsening abdominal pain? no  Any urinary burning? no    Visit Vitals  /71 (BP 1 Location: Left upper arm, BP Patient Position: Sitting, BP Cuff Size: Large adult)   Pulse 75   Temp (!) 96.7 °F (35.9 °C) (Oral)   Ht 5' 5\" (1.651 m)   Wt 162 lb 3.2 oz (73.6 kg)   SpO2 98%   BMI 26.99 kg/m²         1. Have you been to the ER, urgent care clinic since your last visit? Hospitalized since your last visit? No    2. Have you seen or consulted any other health care providers outside of the [address] [place] [address] since your last visit? Include any pap smears or colon screening. Dr. Paulo De Jesus    3 most recent [facility] Screens 6/22/2021   Little interest or pleasure in doing things Not at all   Feeling down, depressed, irritable, or hopeless Not at all   Total Score PHQ 2 0       No flowsheet data found. Fall Risk Assessment, last 12 mths 6/22/2021   Able to walk? Yes   Fall in past 12 months? 0   Do you feel unsteady? 0   Are you worried about falling 0       No flowsheet data found.

## 2021-06-22 NOTE — PROGRESS NOTES
[facility] [facility] SPECIALISTS  [address] [place] [address], [address], [address] [facility]  [address] [facility], [address] [facility]  [place], [address]   [phone]  Meseret Rivers DO      Patient ID:  Name:  Jasen Castillo  MRN:  [id number]  :  1950/70 y.o. Date:  2021      HISTORY OF PRESENT ILLNESS:  Jasen Castillo is a 69 y.o.   postmenopausal female referred by Dr. Lily Bazan for persistent left hydrosalpinx. Pt has been followed for persistent hydrosalpinx. Pt initially had pelvic pain leading to ultrasound. Has been being followed by ultrasound since 2018. She presents today for further management. Currently, Denies Vaginal bleeding, change in vaginal discharge, new abdominopelvic pain, change in bladder/bowel habits. Had a repeat ultrasound and here to review. Labs:  : per patient was normal      Imaging:  Scanned in media  2021 tvus  1.8 x 1.1 x 2.4 cm; 4x4 mm solid hyperechoic area imprinted.  No vascular flow    Ultrasound left fallopian tube  2020: 1.8 x1.2 x 2.0 cm  2019: 1.6 x0.9 x1.4 cm  2018: 1.7 x0.9 x 1.5 cm       ROS:   As above      Patient Active Problem List    Diagnosis Date Noted    Trigger index finger of right hand 2017    Hyperlipidemia 2017    Acquired hypothyroidism 2017    Trigger ring finger of right hand 2017     Past Medical History:   Diagnosis Date    Arthritis     GERD (gastroesophageal reflux disease)     H/O ehrlichiosis 4059    Thyroid disease     hypo      Past Surgical History:   Procedure Laterality Date    HX  SECTION      HX GYN      C Section     HX GYN      tubal ligation     HX KNEE ARTHROSCOPY Bilateral     HX ORTHOPAEDIC Right     3rd finger trigger finger release    HX ORTHOPAEDIC      Ligament surgery     HX ORTHOPAEDIC      finger surgery     HX TUBAL LIGATION        OB History        2    Para   2    Term   0       0 AB   0    Living           SAB   0    TAB   0    Ectopic   0    Molar   0    Multiple        Live Births              Obstetric Comments   2 C sections            Social History     Tobacco Use    Smoking status: Former Smoker     Quit date:      Years since quittin.4    Smokeless tobacco: Never Used   Substance Use Topics    Alcohol use: Yes     Alcohol/week: 1.0 standard drinks     Types: 1 Glasses of wine per week      Family History   Problem Relation Age of Onset    Cancer Brother         Prostate      Current Outpatient Medications   Medication Sig    omeprazole (PriLOSEC OTC) 20 mg tablet Take 20 mg by mouth daily.  naproxen (NAPROSYN) 500 mg tablet TK 1 T PO  BID PRN FOR PAIN    ibuprofen (MOTRIN) 200 mg tablet Take 400 mg by mouth every six (6) hours as needed for Pain.  multivitamin (ONE A DAY) tablet Take 1 Tab by mouth daily.  CALCIUM CARBONATE/VITAMIN D3 (CALCIUM 600 + D,3, PO) Take 1 Tab by mouth daily.  cholecalciferol (VITAMIN D3) 1,000 unit tablet Take 1,000 Units by mouth daily.  levothyroxine (SYNTHROID) 75 mcg tablet Take 37.5 mcg by mouth every Wednesday.  rosuvastatin (CRESTOR) 10 mg tablet Take 10 mg by mouth every Monday, Wednesday, Friday.  Omeprazole delayed release (PRILOSEC D/R) 20 mg tablet Take 20 mg by mouth daily as needed.  Synthroid 75 mcg tablet TAKE ONE TABLET BY MOUTH ONCE DAILY EXCEPT ON WEDNESDAY AND SATURDAY TAKE HALF A TABLET (Patient not taking: Reported on 2021)    rosuvastatin (CRESTOR) 10 mg tablet TAKE 1 TABLET BY MOUTH 4 TIMES A WEEK AT BEDTIME (Patient not taking: Reported on 2021)    HYDROcodone-acetaminophen (NORCO) 5-325 mg per tablet Take 1-2 Tabs by mouth every four (4) hours as needed for Pain. Max Daily Amount: 12 Tabs. (Patient not taking: Reported on 2021)    levothyroxine (SYNTHROID) 75 mcg tablet Take 75 mcg by mouth.  , Monday, Tuesday, Thursday, Friday and Saturday (Patient not taking: Reported on 6/22/2021)     No current facility-administered medications for this visit. Allergies   Allergen Reactions    Tramadol Nausea and Vomiting          OBJECTIVE:    Physical Exam  VITAL SIGNS: Visit Vitals  /71 (BP 1 Location: Left upper arm, BP Patient Position: Sitting, BP Cuff Size: Large adult)   Pulse 75   Temp (!) 96.7 °F (35.9 °C) (Oral)   Ht 5' 5\" (1.651 m)   Wt 73.6 kg (162 lb 3.2 oz)   SpO2 98%   BMI 26.99 kg/m²      GENERAL FAYE: in no apparent distress and well developed and well nourished         IMPRESSION/PLAN:  1. Persistent left hydrosalpinx   -reviewed imaging with enlargement and new solid area. Recommend surgical eval given the growth and change   -discussed plan for laparoscopic LSO with intraop path and hysterectomy, rso, staging if malignant    -pt agreeable.  Will plan to schedule in September     The total time spent was 25 minutes regarding this patients diagnosis of hydrosalpinx and >50% of this time was spent counseling and coordinating care    [facility] On[facility]  6/22/20212:59 PM

## 2021-06-22 NOTE — LETTER
8/44/[id number]    Patient: Yanelis Alberto   YOB: 1950   Date of Visit: 6/22/2021     Patricio Choi DO  [address] [place] [phone]  Via Fax: [phone]    Dear Patricio Choi DO,      [facility] you [facility]. My notes for this consultation are attached. If you have questions, please do not hesitate to call me. I look forward to following your patient along with you.       Sincerely,    Елена Officer, MD

## 2021-06-29 DIAGNOSIS — Z01.818 PREOPERATIVE TESTING: ICD-10-CM

## 2021-09-29 ENCOUNTER — TELEPHONE (OUTPATIENT)
Dept: ONCOLOGY | Age: 71
End: 2021-09-29

## 2021-09-29 NOTE — TELEPHONE ENCOUNTER
Called and explained we would not be able to drain and only way to tell if cancer is to remove  Pt agreeable

## 2021-09-29 NOTE — TELEPHONE ENCOUNTER
Spoke with patient who wanted to know if it was possible to have the hydrosalpinx sampled prior to surgery to determine if there is malignancy and if no malignancy is detected she would like to cancel surgery.

## 2021-10-06 ENCOUNTER — TRANSCRIBE ORDER (OUTPATIENT)
Dept: REGISTRATION | Age: 71
End: 2021-10-06

## 2021-10-06 ENCOUNTER — HOSPITAL ENCOUNTER (OUTPATIENT)
Dept: PREADMISSION TESTING | Age: 71
Discharge: HOME | End: 2021-10-06
Payer: MEDICARE

## 2021-10-06 DIAGNOSIS — Z01.818 PREOPERATIVE TESTING: ICD-10-CM

## 2021-10-06 DIAGNOSIS — N70.11 CHRONIC SALPINGITIS: ICD-10-CM

## 2021-10-06 DIAGNOSIS — N70.11 CHRONIC SALPINGITIS: Primary | ICD-10-CM

## 2021-10-06 LAB
ALBUMIN SERPL-MCNC: 3.8 G/DL (ref 3.4–5)
ALBUMIN/GLOB SERPL: 1 {RATIO} (ref 0.8–1.7)
ALP SERPL-CCNC: 80 U/L (ref 45–117)
ALT SERPL-CCNC: 20 U/L (ref 13–56)
ANION GAP SERPL CALC-SCNC: 3 MMOL/L (ref 3–18)
AST SERPL-CCNC: 24 U/L (ref 10–38)
ATRIAL RATE: 54 BPM
BASOPHILS # BLD: 0 K/UL (ref 0–0.1)
BASOPHILS NFR BLD: 1 % (ref 0–2)
BILIRUB SERPL-MCNC: 0.4 MG/DL (ref 0.2–1)
BUN SERPL-MCNC: 11 MG/DL (ref 7–18)
BUN/CREAT SERPL: 13 (ref 12–20)
CALCIUM SERPL-MCNC: 9.2 MG/DL (ref 8.5–10.1)
CALCULATED P AXIS, ECG09: 79 DEGREES
CALCULATED R AXIS, ECG10: 89 DEGREES
CALCULATED T AXIS, ECG11: 59 DEGREES
CHLORIDE SERPL-SCNC: 107 MMOL/L (ref 100–111)
CO2 SERPL-SCNC: 29 MMOL/L (ref 21–32)
CREAT SERPL-MCNC: 0.87 MG/DL (ref 0.6–1.3)
DIAGNOSIS, 93000: NORMAL
DIFFERENTIAL METHOD BLD: ABNORMAL
EOSINOPHIL # BLD: 0.2 K/UL (ref 0–0.4)
EOSINOPHIL NFR BLD: 5 % (ref 0–5)
ERYTHROCYTE [DISTWIDTH] IN BLOOD BY AUTOMATED COUNT: 12.6 % (ref 11.6–14.5)
GLOBULIN SER CALC-MCNC: 3.8 G/DL (ref 2–4)
GLUCOSE SERPL-MCNC: 89 MG/DL (ref 74–99)
HCT VFR BLD AUTO: 36.1 % (ref 35–45)
HGB BLD-MCNC: 11.6 G/DL (ref 12–16)
LYMPHOCYTES # BLD: 1.3 K/UL (ref 0.9–3.6)
LYMPHOCYTES NFR BLD: 29 % (ref 21–52)
MCH RBC QN AUTO: 30.4 PG (ref 24–34)
MCHC RBC AUTO-ENTMCNC: 32.1 G/DL (ref 31–37)
MCV RBC AUTO: 94.5 FL (ref 78–100)
MONOCYTES # BLD: 0.6 K/UL (ref 0.05–1.2)
MONOCYTES NFR BLD: 12 % (ref 3–10)
NEUTS SEG # BLD: 2.3 K/UL (ref 1.8–8)
NEUTS SEG NFR BLD: 53 % (ref 40–73)
P-R INTERVAL, ECG05: 152 MS
PLATELET # BLD AUTO: 270 K/UL (ref 135–420)
PMV BLD AUTO: 10.3 FL (ref 9.2–11.8)
POTASSIUM SERPL-SCNC: 4.1 MMOL/L (ref 3.5–5.5)
PROT SERPL-MCNC: 7.6 G/DL (ref 6.4–8.2)
Q-T INTERVAL, ECG07: 380 MS
QRS DURATION, ECG06: 82 MS
QTC CALCULATION (BEZET), ECG08: 360 MS
RBC # BLD AUTO: 3.82 M/UL (ref 4.2–5.3)
SODIUM SERPL-SCNC: 139 MMOL/L (ref 136–145)
VENTRICULAR RATE, ECG03: 54 BPM
WBC # BLD AUTO: 4.4 K/UL (ref 4.6–13.2)

## 2021-10-06 PROCEDURE — 80053 COMPREHEN METABOLIC PANEL: CPT

## 2021-10-06 PROCEDURE — 36415 COLL VENOUS BLD VENIPUNCTURE: CPT

## 2021-10-06 PROCEDURE — 85025 COMPLETE CBC W/AUTO DIFF WBC: CPT

## 2021-10-06 PROCEDURE — 93005 ELECTROCARDIOGRAM TRACING: CPT

## 2021-10-07 ENCOUNTER — HOSPITAL ENCOUNTER (OUTPATIENT)
Dept: PREADMISSION TESTING | Age: 71
Discharge: HOME | End: 2021-10-07

## 2021-10-07 VITALS — BODY MASS INDEX: 26.99 KG/M2 | WEIGHT: 162 LBS | HEIGHT: 65 IN

## 2021-10-07 RX ORDER — SODIUM CHLORIDE, SODIUM LACTATE, POTASSIUM CHLORIDE, CALCIUM CHLORIDE 600; 310; 30; 20 MG/100ML; MG/100ML; MG/100ML; MG/100ML
125 INJECTION, SOLUTION INTRAVENOUS CONTINUOUS
Status: CANCELLED | OUTPATIENT
Start: 2021-10-07

## 2021-10-07 RX ORDER — HEPARIN SODIUM 5000 [USP'U]/ML
5000 INJECTION, SOLUTION INTRAVENOUS; SUBCUTANEOUS ONCE
Status: CANCELLED | OUTPATIENT
Start: 2021-10-07 | End: 2021-10-07

## 2021-10-07 RX ORDER — CEFAZOLIN SODIUM/WATER 2 G/20 ML
2 SYRINGE (ML) INTRAVENOUS ONCE
Status: CANCELLED | OUTPATIENT
Start: 2021-10-07 | End: 2021-10-07

## 2021-10-07 NOTE — PERIOP NOTES
Patient educated on NPO, CHG, and current COVID 19 protocols. Patient states she is fully vaccinated, with vaccination in Media. Patient verbally agrees to [facility] screening on 10/20/2021 and self quarantine from that date till [address]. Medications reviewed. Patient educated on current visitation policies. Patient advised to leave valuables at home or with a loved one. All questions answered by RN.

## 2021-10-20 ENCOUNTER — HOSPITAL ENCOUNTER (OUTPATIENT)
Dept: PREADMISSION TESTING | Age: 71
Discharge: HOME | End: 2021-10-20
Payer: MEDICARE

## 2021-10-20 PROCEDURE — U0005 INFEC AGEN DETEC AMPLI PROBE: HCPCS

## 2021-10-21 LAB — SARS-COV-2, NAA: NOT DETECTED

## 2021-10-22 ENCOUNTER — TELEPHONE (OUTPATIENT)
Dept: ONCOLOGY | Age: 71
End: 2021-10-22

## 2021-10-22 ENCOUNTER — ANESTHESIA EVENT (OUTPATIENT)
Dept: SURGERY | Age: 71
End: 2021-10-22
Payer: MEDICARE

## 2021-10-22 NOTE — TELEPHONE ENCOUNTER
Left a message advising patient to contact the office regarding arrival and  Surgery start time. Patient is to arrive at 7:30 AM with a 9:30 AM surgery start time. Office number was left on voicemail.

## 2021-10-25 ENCOUNTER — HOSPITAL ENCOUNTER (OUTPATIENT)
Age: 71
Setting detail: OUTPATIENT SURGERY
Discharge: HOME | End: 2021-10-25
Attending: OBSTETRICS & GYNECOLOGY | Admitting: OBSTETRICS & GYNECOLOGY
Payer: MEDICARE

## 2021-10-25 ENCOUNTER — ANESTHESIA (OUTPATIENT)
Dept: SURGERY | Age: 71
End: 2021-10-25
Payer: MEDICARE

## 2021-10-25 VITALS
RESPIRATION RATE: 12 BRPM | OXYGEN SATURATION: 96 % | TEMPERATURE: 97.5 F | DIASTOLIC BLOOD PRESSURE: 64 MMHG | BODY MASS INDEX: 27.22 KG/M2 | SYSTOLIC BLOOD PRESSURE: 122 MMHG | HEART RATE: 63 BPM | WEIGHT: 163.4 LBS | HEIGHT: 65 IN

## 2021-10-25 DIAGNOSIS — N70.11 HYDROSALPINX: ICD-10-CM

## 2021-10-25 DIAGNOSIS — G89.18 POST-OP PAIN: Primary | ICD-10-CM

## 2021-10-25 LAB
ABO + RH BLD: NORMAL
BLOOD GROUP ANTIBODIES SERPL: NORMAL
SPECIMEN EXP DATE BLD: NORMAL

## 2021-10-25 PROCEDURE — 77030009851 HC PCH RTVR ENDOSC AMR -B: Performed by: OBSTETRICS & GYNECOLOGY

## 2021-10-25 PROCEDURE — 77030008518 HC TBNG INSUF ENDO STRY -B: Performed by: OBSTETRICS & GYNECOLOGY

## 2021-10-25 PROCEDURE — 77030002933 HC SUT MCRYL J&J -A: Performed by: OBSTETRICS & GYNECOLOGY

## 2021-10-25 PROCEDURE — 77030012770 HC TRCR OPT FX AMR -B: Performed by: OBSTETRICS & GYNECOLOGY

## 2021-10-25 PROCEDURE — 77030010507 HC ADH SKN DERMBND J&J -B: Performed by: OBSTETRICS & GYNECOLOGY

## 2021-10-25 PROCEDURE — 74011250636 HC RX REV CODE- 250/636: Performed by: ANESTHESIOLOGY

## 2021-10-25 PROCEDURE — 36415 COLL VENOUS BLD VENIPUNCTURE: CPT

## 2021-10-25 PROCEDURE — 77030040361 HC SLV COMPR DVT MDII -B: Performed by: OBSTETRICS & GYNECOLOGY

## 2021-10-25 PROCEDURE — 76210000026 HC REC RM PH II 1 TO 1.5 HR: Performed by: OBSTETRICS & GYNECOLOGY

## 2021-10-25 PROCEDURE — 2709999900 HC NON-CHARGEABLE SUPPLY: Performed by: OBSTETRICS & GYNECOLOGY

## 2021-10-25 PROCEDURE — 88112 CYTOPATH CELL ENHANCE TECH: CPT

## 2021-10-25 PROCEDURE — 77030008683 HC TU ET CUF COVD -A: Performed by: ANESTHESIOLOGY

## 2021-10-25 PROCEDURE — 77030012799 HC TRCR GELPRT BLN AMR -B: Performed by: OBSTETRICS & GYNECOLOGY

## 2021-10-25 PROCEDURE — 58661 LAPAROSCOPY REMOVE ADNEXA: CPT | Performed by: OBSTETRICS & GYNECOLOGY

## 2021-10-25 PROCEDURE — 77030005513 HC CATH URETH FOL11 MDII -B: Performed by: OBSTETRICS & GYNECOLOGY

## 2021-10-25 PROCEDURE — 77030020829: Performed by: OBSTETRICS & GYNECOLOGY

## 2021-10-25 PROCEDURE — 77030008477 HC STYL SATN SLP COVD -A: Performed by: ANESTHESIOLOGY

## 2021-10-25 PROCEDURE — 74011250636 HC RX REV CODE- 250/636: Performed by: OBSTETRICS & GYNECOLOGY

## 2021-10-25 PROCEDURE — 76210000006 HC OR PH I REC 0.5 TO 1 HR: Performed by: OBSTETRICS & GYNECOLOGY

## 2021-10-25 PROCEDURE — 77030014063 HC TIP MANIP UTER COOP -B: Performed by: OBSTETRICS & GYNECOLOGY

## 2021-10-25 PROCEDURE — 77030016151 HC PROTCTR LNS DFOG COVD -B: Performed by: OBSTETRICS & GYNECOLOGY

## 2021-10-25 PROCEDURE — 77030034154 HC SHR COAG HARM ACE J&J -F: Performed by: OBSTETRICS & GYNECOLOGY

## 2021-10-25 PROCEDURE — 77030020782 HC GWN BAIR PAWS FLX 3M -B: Performed by: OBSTETRICS & GYNECOLOGY

## 2021-10-25 PROCEDURE — 76060000032 HC ANESTHESIA 0.5 TO 1 HR: Performed by: OBSTETRICS & GYNECOLOGY

## 2021-10-25 PROCEDURE — C9290 INJ, BUPIVACAINE LIPOSOME: HCPCS | Performed by: OBSTETRICS & GYNECOLOGY

## 2021-10-25 PROCEDURE — 88305 TISSUE EXAM BY PATHOLOGIST: CPT

## 2021-10-25 PROCEDURE — 77030008574 HC TBNG SUC IRR STRY -B: Performed by: OBSTETRICS & GYNECOLOGY

## 2021-10-25 PROCEDURE — 77030022703 HC LIGASURE  BLNT LAPSCP SEAL COVD -E: Performed by: OBSTETRICS & GYNECOLOGY

## 2021-10-25 PROCEDURE — 77030031139 HC SUT VCRL2 J&J -A: Performed by: OBSTETRICS & GYNECOLOGY

## 2021-10-25 PROCEDURE — 86901 BLOOD TYPING SEROLOGIC RH(D): CPT

## 2021-10-25 PROCEDURE — 77030006643: Performed by: ANESTHESIOLOGY

## 2021-10-25 PROCEDURE — 74011250637 HC RX REV CODE- 250/637: Performed by: ANESTHESIOLOGY

## 2021-10-25 PROCEDURE — 74011000250 HC RX REV CODE- 250: Performed by: OBSTETRICS & GYNECOLOGY

## 2021-10-25 PROCEDURE — 74011000250 HC RX REV CODE- 250: Performed by: ANESTHESIOLOGY

## 2021-10-25 PROCEDURE — 76010000138 HC OR TIME 0.5 TO 1 HR: Performed by: OBSTETRICS & GYNECOLOGY

## 2021-10-25 RX ORDER — HYDROMORPHONE HYDROCHLORIDE 1 MG/ML
0.5 INJECTION, SOLUTION INTRAMUSCULAR; INTRAVENOUS; SUBCUTANEOUS
Status: DISCONTINUED | OUTPATIENT
Start: 2021-10-25 | End: 2021-10-25 | Stop reason: HOSPADM

## 2021-10-25 RX ORDER — SODIUM CHLORIDE, SODIUM LACTATE, POTASSIUM CHLORIDE, CALCIUM CHLORIDE 600; 310; 30; 20 MG/100ML; MG/100ML; MG/100ML; MG/100ML
125 INJECTION, SOLUTION INTRAVENOUS CONTINUOUS
Status: DISCONTINUED | OUTPATIENT
Start: 2021-10-25 | End: 2021-10-25 | Stop reason: HOSPADM

## 2021-10-25 RX ORDER — MIDAZOLAM HYDROCHLORIDE 1 MG/ML
INJECTION, SOLUTION INTRAMUSCULAR; INTRAVENOUS AS NEEDED
Status: DISCONTINUED | OUTPATIENT
Start: 2021-10-25 | End: 2021-10-25 | Stop reason: HOSPADM

## 2021-10-25 RX ORDER — SODIUM CHLORIDE 0.9 % (FLUSH) 0.9 %
5-40 SYRINGE (ML) INJECTION EVERY 8 HOURS
Status: DISCONTINUED | OUTPATIENT
Start: 2021-10-25 | End: 2021-10-25 | Stop reason: HOSPADM

## 2021-10-25 RX ORDER — PROPOFOL 10 MG/ML
INJECTION, EMULSION INTRAVENOUS AS NEEDED
Status: DISCONTINUED | OUTPATIENT
Start: 2021-10-25 | End: 2021-10-25 | Stop reason: HOSPADM

## 2021-10-25 RX ORDER — DEXAMETHASONE SODIUM PHOSPHATE 4 MG/ML
INJECTION, SOLUTION INTRA-ARTICULAR; INTRALESIONAL; INTRAMUSCULAR; INTRAVENOUS; SOFT TISSUE AS NEEDED
Status: DISCONTINUED | OUTPATIENT
Start: 2021-10-25 | End: 2021-10-25 | Stop reason: HOSPADM

## 2021-10-25 RX ORDER — CEFAZOLIN SODIUM/WATER 2 G/20 ML
2 SYRINGE (ML) INTRAVENOUS ONCE
Status: COMPLETED | OUTPATIENT
Start: 2021-10-25 | End: 2021-10-25

## 2021-10-25 RX ORDER — FLUMAZENIL 0.1 MG/ML
INJECTION INTRAVENOUS AS NEEDED
Status: DISCONTINUED | OUTPATIENT
Start: 2021-10-25 | End: 2021-10-25 | Stop reason: HOSPADM

## 2021-10-25 RX ORDER — LIDOCAINE HYDROCHLORIDE 20 MG/ML
INJECTION, SOLUTION EPIDURAL; INFILTRATION; INTRACAUDAL; PERINEURAL AS NEEDED
Status: DISCONTINUED | OUTPATIENT
Start: 2021-10-25 | End: 2021-10-25 | Stop reason: HOSPADM

## 2021-10-25 RX ORDER — HEPARIN SODIUM 5000 [USP'U]/ML
5000 INJECTION, SOLUTION INTRAVENOUS; SUBCUTANEOUS ONCE
Status: COMPLETED | OUTPATIENT
Start: 2021-10-25 | End: 2021-10-25

## 2021-10-25 RX ORDER — OXYCODONE AND ACETAMINOPHEN 5; 325 MG/1; MG/1
1 TABLET ORAL
Qty: 40 TABLET | Refills: 0 | Status: SHIPPED | OUTPATIENT
Start: 2021-10-25 | End: 2021-11-08

## 2021-10-25 RX ORDER — KETAMINE HCL IN 0.9 % NACL 50 MG/5 ML
SYRINGE (ML) INTRAVENOUS AS NEEDED
Status: DISCONTINUED | OUTPATIENT
Start: 2021-10-25 | End: 2021-10-25 | Stop reason: HOSPADM

## 2021-10-25 RX ORDER — GLYCOPYRROLATE 0.2 MG/ML
INJECTION INTRAMUSCULAR; INTRAVENOUS AS NEEDED
Status: DISCONTINUED | OUTPATIENT
Start: 2021-10-25 | End: 2021-10-25 | Stop reason: HOSPADM

## 2021-10-25 RX ORDER — ONDANSETRON 2 MG/ML
INJECTION INTRAMUSCULAR; INTRAVENOUS AS NEEDED
Status: DISCONTINUED | OUTPATIENT
Start: 2021-10-25 | End: 2021-10-25 | Stop reason: HOSPADM

## 2021-10-25 RX ORDER — NEOSTIGMINE METHYLSULFATE 1 MG/ML
INJECTION, SOLUTION INTRAVENOUS AS NEEDED
Status: DISCONTINUED | OUTPATIENT
Start: 2021-10-25 | End: 2021-10-25 | Stop reason: HOSPADM

## 2021-10-25 RX ORDER — SODIUM CHLORIDE 0.9 % (FLUSH) 0.9 %
5-40 SYRINGE (ML) INJECTION AS NEEDED
Status: DISCONTINUED | OUTPATIENT
Start: 2021-10-25 | End: 2021-10-25 | Stop reason: HOSPADM

## 2021-10-25 RX ORDER — DEXMEDETOMIDINE HYDROCHLORIDE 100 UG/ML
INJECTION, SOLUTION INTRAVENOUS AS NEEDED
Status: DISCONTINUED | OUTPATIENT
Start: 2021-10-25 | End: 2021-10-25 | Stop reason: HOSPADM

## 2021-10-25 RX ORDER — ROCURONIUM BROMIDE 10 MG/ML
INJECTION, SOLUTION INTRAVENOUS AS NEEDED
Status: DISCONTINUED | OUTPATIENT
Start: 2021-10-25 | End: 2021-10-25 | Stop reason: HOSPADM

## 2021-10-25 RX ORDER — FENTANYL CITRATE 50 UG/ML
25 INJECTION, SOLUTION INTRAMUSCULAR; INTRAVENOUS AS NEEDED
Status: DISCONTINUED | OUTPATIENT
Start: 2021-10-25 | End: 2021-10-25 | Stop reason: HOSPADM

## 2021-10-25 RX ORDER — KETOROLAC TROMETHAMINE 15 MG/ML
INJECTION, SOLUTION INTRAMUSCULAR; INTRAVENOUS AS NEEDED
Status: DISCONTINUED | OUTPATIENT
Start: 2021-10-25 | End: 2021-10-25 | Stop reason: HOSPADM

## 2021-10-25 RX ORDER — OXYCODONE HYDROCHLORIDE 5 MG/1
5 TABLET ORAL
Status: DISCONTINUED | OUTPATIENT
Start: 2021-10-25 | End: 2021-10-25 | Stop reason: HOSPADM

## 2021-10-25 RX ADMIN — DEXAMETHASONE SODIUM PHOSPHATE 5 MG: 4 INJECTION, SOLUTION INTRAMUSCULAR; INTRAVENOUS at 10:04

## 2021-10-25 RX ADMIN — PROPOFOL 50 MG: 10 INJECTION, EMULSION INTRAVENOUS at 09:58

## 2021-10-25 RX ADMIN — MIDAZOLAM 2 MG: 1 INJECTION INTRAMUSCULAR; INTRAVENOUS at 09:46

## 2021-10-25 RX ADMIN — KETOROLAC TROMETHAMINE 30 MG: 15 INJECTION, SOLUTION INTRAMUSCULAR; INTRAVENOUS at 10:24

## 2021-10-25 RX ADMIN — LIDOCAINE HYDROCHLORIDE 100 MG: 20 INJECTION, SOLUTION INTRAVENOUS at 09:52

## 2021-10-25 RX ADMIN — GLYCOPYRROLATE 0.3 MG: 0.2 INJECTION INTRAMUSCULAR; INTRAVENOUS at 10:26

## 2021-10-25 RX ADMIN — Medication 20 MG: at 10:01

## 2021-10-25 RX ADMIN — HEPARIN SODIUM 5000 UNITS: 5000 INJECTION INTRAVENOUS; SUBCUTANEOUS at 08:37

## 2021-10-25 RX ADMIN — DEXMEDETOMIDINE HYDROCHLORIDE 5 MCG: 100 INJECTION, SOLUTION INTRAVENOUS at 10:16

## 2021-10-25 RX ADMIN — DEXAMETHASONE SODIUM PHOSPHATE 4 MG: 4 INJECTION, SOLUTION INTRAMUSCULAR; INTRAVENOUS at 10:11

## 2021-10-25 RX ADMIN — OXYCODONE 5 MG: 5 TABLET ORAL at 12:56

## 2021-10-25 RX ADMIN — CEFAZOLIN 2 G: 1 INJECTION, POWDER, FOR SOLUTION INTRAVENOUS at 09:51

## 2021-10-25 RX ADMIN — Medication 20 MG: at 09:52

## 2021-10-25 RX ADMIN — ROCURONIUM BROMIDE 30 MG: 10 INJECTION, SOLUTION INTRAVENOUS at 09:54

## 2021-10-25 RX ADMIN — FENTANYL CITRATE 25 MCG: 50 INJECTION, SOLUTION INTRAMUSCULAR; INTRAVENOUS at 11:18

## 2021-10-25 RX ADMIN — SODIUM CHLORIDE, SODIUM LACTATE, POTASSIUM CHLORIDE, AND CALCIUM CHLORIDE 125 ML/HR: 600; 310; 30; 20 INJECTION, SOLUTION INTRAVENOUS at 08:36

## 2021-10-25 RX ADMIN — PROPOFOL 120 MG: 10 INJECTION, EMULSION INTRAVENOUS at 09:53

## 2021-10-25 RX ADMIN — ONDANSETRON HYDROCHLORIDE 4 MG: 2 INJECTION INTRAMUSCULAR; INTRAVENOUS at 10:24

## 2021-10-25 RX ADMIN — GLYCOPYRROLATE 0.1 MG: 0.2 INJECTION INTRAMUSCULAR; INTRAVENOUS at 09:46

## 2021-10-25 RX ADMIN — FLUMAZENIL 0.2 MCG: 0.1 INJECTION, SOLUTION INTRAVENOUS at 10:43

## 2021-10-25 RX ADMIN — DEXMEDETOMIDINE HYDROCHLORIDE 5 MCG: 100 INJECTION, SOLUTION INTRAVENOUS at 10:00

## 2021-10-25 RX ADMIN — SODIUM CHLORIDE, SODIUM LACTATE, POTASSIUM CHLORIDE, AND CALCIUM CHLORIDE: 600; 310; 30; 20 INJECTION, SOLUTION INTRAVENOUS at 10:27

## 2021-10-25 RX ADMIN — Medication 1.5 MG: at 10:26

## 2021-10-25 NOTE — PERIOP NOTES
Patient and family given discharge instructions    AVS med list reviewed, no duplicates noted. D/C instructions reviewed, opportunity for questions.

## 2021-10-25 NOTE — ANESTHESIA POSTPROCEDURE EVALUATION
Post-Anesthesia Evaluation and Assessment    Cardiovascular Function/Vital Signs  Visit Vitals  /68   Pulse (!) 57   Temp 36.3 °C (97.3 °F)   Resp 11   Ht 5' 5\" (1.651 m)   Wt 74.1 kg (163 lb 6.4 oz)   SpO2 100%   BMI 27.19 kg/m²       Patient is status post Procedure(s):  LAPAROSCOPIC LEFT SALPINGO-OOPHORECTOMY. Nausea/Vomiting: Controlled. Postoperative hydration reviewed and adequate. Pain:  Pain Scale 1: FLACC (10/25/21 1128)  Pain Intensity 1: 0 (10/25/21 1128)   Managed. Neurological Status:   Neuro (WDL): Exceptions to WDL (10/25/21 1128)   At baseline. Mental Status and Level of Consciousness: Baseline and stable. Pulmonary Status:   O2 Device: Nasal cannula (10/25/21 1101)   Adequate oxygenation and airway patent. Complications related to anesthesia: None    Post-anesthesia assessment completed. No concerns. Patient has met all discharge requirements.     Signed By: Mark Thacker MD

## 2021-10-25 NOTE — ANESTHESIA PREPROCEDURE EVALUATION
Relevant Problems   ENDOCRINE   (+) Acquired hypothyroidism       Anesthetic History   No history of anesthetic complications            Review of Systems / Medical History  Patient summary reviewed, nursing notes reviewed and pertinent labs reviewed    Pulmonary  Within defined limits                 Neuro/Psych   Within defined limits           Cardiovascular  Within defined limits                Exercise tolerance: >4 METS     GI/Hepatic/Renal     GERD           Endo/Other      Hypothyroidism  Arthritis     Other Findings              Physical Exam    Airway  Mallampati: III  TM Distance: 4 - 6 cm  Neck ROM: normal range of motion   Mouth opening: Normal     Cardiovascular  Regular rate and rhythm,  S1 and S2 normal,  no murmur, click, rub, or gallop  Rhythm: regular  Rate: normal         Dental  No notable dental hx       Pulmonary  Breath sounds clear to auscultation               Abdominal  GI exam deferred       Other Findings            Anesthetic Plan    ASA: 2  Anesthesia type: general          Induction: Intravenous  Anesthetic plan and risks discussed with: Patient

## 2021-10-25 NOTE — DISCHARGE INSTRUCTIONS
DISCHARGE SUMMARY from Nurse    Increase fluids today  Advance diet as tolerated  Call Dr. Dakota Silveira if you develop fever over 101, chills, and\or uncontrolled nausea and vomiting  Call Dr. Dakota Silveira also if you have excessive drainage from incision sites on abdomen, and/or if you saturate more than one maxipad in 2 consecutive hours  Do not place ointments, lotions, or powders on incision sites    PATIENT INSTRUCTIONS:    After general anesthesia or intravenous sedation, for 24 hours or while taking prescription Narcotics:  · Limit your activities  · Do not drive and operate hazardous machinery  · Do not make important personal or business decisions  · Do  not drink alcoholic beverages  · If you have not urinated within 8 hours after discharge, please contact your surgeon on call. Report the following to your surgeon:  · Excessive pain, swelling, redness or odor of or around the surgical area  · Temperature over 100.5  · Nausea and vomiting lasting longer than 4 hours or if unable to take medications  · Any signs of decreased circulation or nerve impairment to extremity: change in color, persistent  numbness, tingling, coldness or increase pain  · Any questions    What to do at Home:  Recommended activity: Activity as tolerated and no driving for today,     If you experience any of the following symptoms as noted above, please follow up with Dr. Dakota Silveira. *  Please give a list of your current medications to your Primary Care Provider. *  Please update this list whenever your medications are discontinued, doses are      changed, or new medications (including over-the-counter products) are added. *  Please carry medication information at all times in case of emergency situations.     These are general instructions for a healthy lifestyle:    No smoking/ No tobacco products/ Avoid exposure to second hand smoke  Surgeon General's Warning:  Quitting smoking now greatly reduces serious risk to your health. Obesity, smoking, and sedentary lifestyle greatly increases your risk for illness    A healthy diet, regular physical exercise & weight monitoring are important for maintaining a healthy lifestyle    You may be retaining fluid if you have a history of heart failure or if you experience any of the following symptoms:  Weight gain of 3 pounds or more overnight or 5 pounds in a week, increased swelling in our hands or feet or shortness of breath while lying flat in bed. Please call your doctor as soon as you notice any of these symptoms; do not wait until your next office visit. The discharge information has been reviewed with the patient and caregiver. The patient and caregiver verbalized understanding. Discharge medications reviewed with the patient and caregiver and appropriate educational materials and side effects teaching were provided.   ___________________________________________________________________________________________________________________________________    Patient armband removed and shredded

## 2021-10-25 NOTE — PERIOP NOTES
Reviewed PTA medication list with patient/caregiver and patient/caregiver denies any additional medications. Patient admits to having a responsible adult care for them at home for at least 24 hours after surgery. Patient encouraged to use gown warming system and informed that using said warming gown to regulate body temperature prior to a procedure has been shown to help reduce the risks of blood clots and infection. Patient's pharmacy of choice verified and documented in PTA medication section. Dual skin assessment & fall risk band verification completed with Francis Jackson RN.

## 2021-10-25 NOTE — PROGRESS NOTES
[facility] SPECIAL[facility]  [address] [place] [address], [address], [address] [facility]  [address] [facility], [address] [facility]  [place], [address]   [phone]  Kristina Isabel DO        Patient ID:  Name:              Laura Theodore  MRN:               [id number]  :               1950/70 y.o. Date:                10/25/2021        HISTORY OF PRESENT ILLNESS:  Laura Theodore is a 69 y.o.   postmenopausal female referred by Dr. Lei Mitchell for persistent left hydrosalpinx. Pt has been followed for persistent hydrosalpinx. Pt initially had pelvic pain leading to ultrasound. Has been being followed by ultrasound since 2018. She presents today for further management. Currently, Denies Vaginal bleeding, change in vaginal discharge, new abdominopelvic pain, change in bladder/bowel habits. Had a repeat ultrasound and here to review.              Labs:  : per patient was normal        Imaging:  Scanned in media  2021 tvus  1.8 x 1.1 x 2.4 cm; 4x4 mm solid hyperechoic area imprinted.  No vascular flow     Ultrasound left fallopian tube  2020: 1.8 x1.2 x 2.0 cm  2019: 1.6 x0.9 x1.4 cm  2018: 1.7 x0.9 x 1.5 cm        ROS:   As above             Patient Active Problem List     Diagnosis Date Noted    Trigger index finger of right hand 2017    Hyperlipidemia 2017    Acquired hypothyroidism 2017    Trigger ring finger of right hand 2017           Past Medical History:   Diagnosis Date    Arthritis      GERD (gastroesophageal reflux disease)      H/O ehrlichiosis 8348    Thyroid disease       hypo      Past Surgical History:   Procedure Laterality Date    HX  SECTION       HX GYN         C Section     HX GYN         tubal ligation     HX KNEE ARTHROSCOPY Bilateral      HX ORTHOPAEDIC Right       3rd finger trigger finger release    HX ORTHOPAEDIC         Ligament surgery     HX ORTHOPAEDIC         finger surgery     HX TUBAL LIGATION                  OB History         2    Para   2    Term   0       0    AB   0    Living            SAB   0    TAB   0    Ectopic   0    Molar   0    Multiple        Live Births               Obstetric Comments   2 C sections              Social History            Tobacco Use    Smoking status: Former Smoker       Quit date: 12       Years since quittin.4    Smokeless tobacco: Never Used   Substance Use Topics    Alcohol use: Yes       Alcohol/week: 1.0 standard drinks       Types: 1 Glasses of wine per week            Family History   Problem Relation Age of Onset    Cancer Brother           Prostate           Current Outpatient Medications   Medication Sig    omeprazole (PriLOSEC OTC) 20 mg tablet Take 20 mg by mouth daily.  naproxen (NAPROSYN) 500 mg tablet TK 1 T PO  BID PRN FOR PAIN    ibuprofen (MOTRIN) 200 mg tablet Take 400 mg by mouth every six (6) hours as needed for Pain.  multivitamin (ONE A DAY) tablet Take 1 Tab by mouth daily.  CALCIUM CARBONATE/VITAMIN D3 (CALCIUM 600 + D,3, PO) Take 1 Tab by mouth daily.  cholecalciferol (VITAMIN D3) 1,000 unit tablet Take 1,000 Units by mouth daily.  levothyroxine (SYNTHROID) 75 mcg tablet Take 37.5 mcg by mouth every Wednesday.  rosuvastatin (CRESTOR) 10 mg tablet Take 10 mg by mouth every Monday, Wednesday, Friday.  Omeprazole delayed release (PRILOSEC D/R) 20 mg tablet Take 20 mg by mouth daily as needed.  Synthroid 75 mcg tablet TAKE ONE TABLET BY MOUTH ONCE DAILY EXCEPT ON WEDNESDAY AND SATURDAY TAKE HALF A TABLET (Patient not taking: Reported on 2021)    rosuvastatin (CRESTOR) 10 mg tablet TAKE 1 TABLET BY MOUTH 4 TIMES A WEEK AT BEDTIME (Patient not taking: Reported on 2021)    HYDROcodone-acetaminophen (NORCO) 5-325 mg per tablet Take 1-2 Tabs by mouth every four (4) hours as needed for Pain. Max Daily Amount: 12 Tabs.  (Patient not taking: Reported on 2021)    levothyroxine (SYNTHROID) 75 mcg tablet Take 75 mcg by mouth. Sunday, Monday, Tuesday, Thursday, Friday and Saturday (Patient not taking: Reported on 6/22/2021)      No current facility-administered medications for this visit.           Allergies   Allergen Reactions    Tramadol Nausea and Vomiting            OBJECTIVE:     Physical Exam  VITAL SIGNS: Visit Vitals  /71 (BP 1 Location: Left upper arm, BP Patient Position: Sitting, BP Cuff Size: Large adult)   Pulse 75   Temp (!) 96.7 °F (35.9 °C) (Oral)   Ht 5' 5\" (1.651 m)   Wt 73.6 kg (162 lb 3.2 oz)   SpO2 98%   BMI 26.99 kg/m²       GENERAL FAYE: in no apparent distress and well developed and well nourished            IMPRESSION/PLAN:  1. Persistent left hydrosalpinx              -reviewed imaging with enlargement and new solid area. Recommend surgical eval given the growth and change              -discussed plan for laparoscopic LSO with intraop path and hysterectomy, rso, staging if malignant               -pt agreeable.  Will plan to schedule in September                The total time spent was 25 minutes regarding this patients diagnosis of hydrosalpinx and >50% of this time was spent counseling and coordinating care     [address] [place] [address]  Gynecologic Oncology

## 2021-10-25 NOTE — OP NOTES
[facility]    Name:  Arjun Wilkins  MR#:   [id number]  :  1950  ACCOUNT #:  [de-identified]  DATE OF SERVICE:  10/25/2021    PREOPERATIVE DIAGNOSIS:  Persistent left hydrosalpinx. POSTOPERATIVE DIAGNOSIS:  Persistent left hydrosalpinx. PROCEDURE PERFORMED:  Laparoscopic left salpingo-oophorectomy and washing. SURGEON:  Khoa Renee DO    ASSISTANT:  Gayatri Owens. ASSISTANT TASKS:  Camera holding and closing. ANESTHESIA:  General.    FLUIDS:  1000 mL. COMPLICATIONS:  None. SPECIMENS REMOVED:  Left tube and ovary and washings. IMPLANTS:  None. ESTIMATED BLOOD LOSS:  Minimal.    URINE OUTPUT:  100 mL of clear urine. FINDINGS:  Laparoscopic findings revealed normal uterus, right tube and ovary. Left ovary appeared normal with 2 cm paratubal cyst.    INDICATIONS:  The patient is a 43-year-old who had been being followed for her persistent left hydrosalpinx. She initially had pelvic pain with hydrosalpinx since 2018. Most recently, had an ultrasound in May, which revealed slight enlargement with a small solid hyperechoic area with no flow. She presents today for definitive surgical management. PROCEDURE:  The patient was taken to the operating room where anesthesia was obtained without difficulty. She was placed in dorsal lithotomy position, prepped and draped in normal sterile fashion. Surgical time-out was taken by the entire surgical team.  Vazquez catheter was placed. Sterile speculum was then placed in the patient's vagina. Anterior lip of the cervix was grasped with a single tooth tenaculum. The cervix was then dilated and a 6-cm YOSEF tip was placed without difficulty. Attention was then turned to the abdomen where an incision was made below the umbilicus and carried down to the underlying fascia. The fascia was then grasped with Kocher clamps and incised using Isbell scissors. A 0 Vicryl stay suture was placed. Intra-abdominal entry was obtained using blunt dissection. The Manda trocar was then advanced and pneumoperitoneum was obtained to 15 mmHg. Laparoscope was introduced. There was no evidence of injury from our entry. The patient was placed in Trendelenburg with findings above. At this point, two 5-mm trocars were placed in the left side of the abdomen under direct visualization. Washings were then obtained. The ureter was seen peristalsing below. The IP ligament was then sealed and divided using the LigaSure device and the broad ligament was incised towards the uterine fundus  The left fallopian tube and utero-ovarian ligament were divided at the uterine fundus. The specimens were placed in an EndoCatch bag, removed and inspected grossly with no obvious concerns. At this point, the Manda trocar was reinserted and pneumoperitoneum was re-obtained. The laparoscope was reintroduced. The pelvis was irrigated. There was good hemostasis. At this point, all trocars were removed and pneumoperitoneum was relieved. The supraumbilical fascial incision was reapproximated with 0 Vicryl figure-of-eight stitch x3, and all skin sites were closed with 4-0 Monocryl and Dermabond. Exparel was injected at the conclusion. The patient tolerated the procedure well. Sponge, needle, and instrument counts were correct x2, and the patient was taken to the recovery room in stable condition.       Debi Ramos DO [email or web address]  D:  10/25/2021 10:37  T:  10/25/2021 15:18  JOB #:  [id number]

## 2021-10-25 NOTE — BRIEF OP NOTE
Brief Postoperative Note    Patient: Дмитрий Knapp  YOB: 1950  MRN: [id number]    Date of Procedure: 10/25/2021     Pre-Op Diagnosis: Hydrosalpinx    Post-Op Diagnosis: Same as preoperative diagnosis. Procedure(s):  LAPAROSCOPIC LEFT SALPINGO-OOPHORECTOMY    Surgeon(s):  Migel Amezquita MD    Surgical Assistant: Surg Asst-1: Gee HOSKINS    Anesthesia: General     Estimated Blood Loss (mL): Minimal    Complications: None    Specimens:   ID Type Source Tests Collected by Time Destination   1 : left tube and ovary Preservative Ovary  Migel Amezquita MD 10/25/2021 1023 Pathology   1 :     Migel Amezquita MD 10/25/2021 1025 Cytology        Implants: * No implants in log *    Drains: * No LDAs found *    Findings: normal uterus, right tube/ovary.  Left ovary normal, 2 cm paratubal cyst    Electronically Signed by Keith Hargrove MD on 10/25/2021 at 10:29 AM

## 2021-10-29 ENCOUNTER — TELEPHONE (OUTPATIENT)
Dept: ONCOLOGY | Age: 71
End: 2021-10-29

## 2021-10-29 NOTE — TELEPHONE ENCOUNTER
Returned call to patient who stated she is having constipation, gas, and bloating. She has had one small bowel movement, denies fever. She has used a stool softener and laxative once each. Encouraged patient to increase hydration and stool softener use, advised to use laxative again this afternoon if stool softener doesn't provide relief. Patient is using 1-2 Percocet a day for pain, states she doesn't have much pain. Recommended she try Ibuprofen or Tylenol for pain relief as Percocet can cause constipation, patient agreeable. She will call back if symptoms worsen or fail to improve.

## 2021-11-08 NOTE — PROGRESS NOTES
[facility]  [address] [place] [address], [address], [address] [facility]  [address] [facility], [address] [place] [address]  [place], [address]   [phone]  Jules Rene DO      Postoperative Office Note  Patient ID:  Name: Chrissy Ott  MRM: [id number]  : 1950/70 y.o. Date: 2021    SUBJECTIVE:    This is a 69 y.o.  female who presents s/p Laparoscopic left salpingo-oophorectomy and washing. on 10/25/2021. Appetite is good. Eating a regular diet without difficulty. Urinating without difficulty. Bowel movements are regular now but she did have some bloating and constipation but took stool softeners and has resolved. The patient is not having any pain. .    Her pathology revealed   Left fallopian tube and ovary, left salpingo-oophorectomy:        Benign fallopian tube and ovary.        Benign tubo-ovarian cyst.       Medications:     Current Outpatient Medications on File Prior to Visit   Medication Sig Dispense Refill    rosuvastatin (CRESTOR) 10 mg tablet 10 mg nightly. Monday, Wednesday, Saturday      omeprazole (PriLOSEC OTC) 20 mg tablet Take 20 mg by mouth daily.  ibuprofen (MOTRIN) 200 mg tablet Take 400 mg by mouth every six (6) hours as needed for Pain.  multivitamin (ONE A DAY) tablet Take 1 Tab by mouth daily.  cholecalciferol (VITAMIN D3) 1,000 unit tablet Take 1,000 Units by mouth daily.  levothyroxine (SYNTHROID) 75 mcg tablet Take 75 mcg by mouth. , Monday, Tuesday, Thursday, Friday      levothyroxine (SYNTHROID) 75 mcg tablet Take 37.5 mcg by mouth. Wednesday and Saturday      [] oxyCODONE-acetaminophen (Percocet) 5-325 mg per tablet Take 1 Tablet by mouth every four (4) hours as needed for Pain for up to 14 days. Max Daily Amount: 6 Tablets. 40 Tablet 0    CALCIUM CARBONATE/VITAMIN D3 (CALCIUM 600 + D,3, PO) Take 1 Tab by mouth daily.  (Patient not taking: Reported on 11/9/2021)       No current facility-administered medications on file prior to visit. Allergies: Allergies   Allergen Reactions    Tramadol Nausea and Vomiting       OBJECTIVE:    Vitals:   Visit Vitals  /75 (BP 1 Location: Left upper arm, BP Patient Position: Sitting)   Pulse 85   Temp 97.1 °F (36.2 °C) (Oral)   Ht 5' 5\" (1.651 m)   Wt 73.9 kg (163 lb)   SpO2 95%   BMI 27.12 kg/m²       Physical Examination:    General:  alert, cooperative, no distress   Abdomen: soft, , non-tender   Incision: healing well   Pelvic: deferred   Rectal: not done   Extremity:   extremities normal, atraumatic, no cyanosis or edema     IMPRESSION/PLAN:    Griffith Holter is Doing well postoperatively. .  She has a working diagnosis of benign tubo-ovarian cyst.  The operative procedures and clinical results have been reviewed with the patient. Implications of diagnosis discussed at length. All questions answered. I will see the patient back prn. The patient is advised to call our office with any problems or concerns.     Corry Elliott DO  Gynecologic Oncology  11/9/2021/10:15 AM

## 2021-11-09 ENCOUNTER — OFFICE VISIT (OUTPATIENT)
Dept: ONCOLOGY | Age: 71
End: 2021-11-09
Payer: MEDICARE

## 2021-11-09 VITALS
WEIGHT: 163 LBS | BODY MASS INDEX: 27.16 KG/M2 | DIASTOLIC BLOOD PRESSURE: 75 MMHG | HEART RATE: 85 BPM | TEMPERATURE: 97.1 F | HEIGHT: 65 IN | OXYGEN SATURATION: 95 % | SYSTOLIC BLOOD PRESSURE: 130 MMHG

## 2021-11-09 DIAGNOSIS — N83.8 PARATUBAL CYST: Primary | ICD-10-CM

## 2021-11-09 PROCEDURE — 99024 POSTOP FOLLOW-UP VISIT: CPT | Performed by: OBSTETRICS & GYNECOLOGY

## 2021-11-09 NOTE — LETTER
21/7/4307    Patient: Danie Tello   YOB: 1950   Date of Visit: 11/9/2021     Elvis Adler DO  [address] [place] [phone]  Via Fax: [phone]    Dear Elvis Adler DO,      [facility]. My notes for this consultation are attached. If you have questions, please do not hesitate to call me. I look forward to following your patient along with you.       Sincerely,    Ivette Qureshi MD

## 2021-11-09 NOTE — PROGRESS NOTES
Mckenzie Mckeon is a 69 y.o. female presents in office for surgical f/u. Chief Complaint   Patient presents with    Surgical Follow-up        Do you have any unusual vaginal bleeding, discharge or irritation? Pt reports she had spotting for about 10 days after her surgery but has stopped the past couple days. Do you have any changes in your bowel movements? Pt had some constipation w/ bloating due to gas but pt has taken stool softener and sxs have improved. Have you been experiencing nausea or vomiting? no  Have you been experiencing any continuous or worsening abdominal pain? no  Any urinary burning? no    Visit Vitals  /75 (BP 1 Location: Left upper arm, BP Patient Position: Sitting)   Pulse 85   Temp 97.1 °F (36.2 °C) (Oral)   Ht 5' 5\" (1.651 m)   Wt 163 lb (73.9 kg)   SpO2 95%   BMI 27.12 kg/m²         1. Have you been to the ER, urgent care clinic since your last visit? Hospitalized since your last visit? No    2. Have you seen or consulted any other health care providers outside of the [address] [place] [address] since your last visit? Include any pap smears or colon screening. No    3 most recent PHQ Screens 11/9/2021   Little interest or pleasure in doing things Not at all   Feeling down, depressed, irritable, or hopeless Not at all   Total Score PHQ 2 0       No flowsheet data found. Fall Risk Assessment, last 12 mths 11/9/2021   Able to walk? Yes   Fall in past 12 months? 0   Do you feel unsteady? 0   Are you worried about falling 0       No flowsheet data found.

## (undated) DEVICE — DEVON™ KNEE AND BODY STRAP 60" X 3" (1.5 M X 7.6 CM): Brand: DEVON

## (undated) DEVICE — BANDAGE COMPR W3XL186IN SYNTH KNIT DSGN COHESIVE ELAS ECON

## (undated) DEVICE — VISUALIZATION SYSTEM: Brand: CLEARIFY

## (undated) DEVICE — TOTAL TRAY, 16FR 10ML SIL FOLEY, URN: Brand: MEDLINE

## (undated) DEVICE — (D)PACK EXTREMITY CUSTOM -- DISC BY MFR USE ITEM 338833

## (undated) DEVICE — REM POLYHESIVE ADULT PATIENT RETURN ELECTRODE: Brand: VALLEYLAB

## (undated) DEVICE — TROCAR: Brand: KII® SLEEVE

## (undated) DEVICE — PAD PT POS 36 IN SURGYPAD DISP

## (undated) DEVICE — (D)PREP SKN CHLRAPRP APPL 26ML -- CONVERT TO ITEM 371833

## (undated) DEVICE — SEALER ENDOSCP L37CM NANO COAT BLNT TIP LAP DIV

## (undated) DEVICE — DISPOSABLE SUCTION/IRRIGATOR TUBE SET WITH TIP: Brand: AHTO

## (undated) DEVICE — STERILE POLYISOPRENE POWDER-FREE SURGICAL GLOVES: Brand: PROTEXIS

## (undated) DEVICE — TIP MANIP UTER RUMI 6.7MMX6CM --

## (undated) DEVICE — GYN ONCOLOGY: Brand: MEDLINE INDUSTRIES, INC.

## (undated) DEVICE — STRIP,CLOSURE,WOUND,MEDI-STRIP,1/2X4: Brand: MEDLINE

## (undated) DEVICE — SUT VCRL + 0 36IN UR6 VIO --

## (undated) DEVICE — GLOVE SURG SZ 8 L12IN FNGR THK79MIL GRN LTX FREE

## (undated) DEVICE — [HIGH FLOW INSUFFLATOR,  DO NOT USE IF PACKAGE IS DAMAGED,  KEEP DRY,  KEEP AWAY FROM SUNLIGHT,  PROTECT FROM HEAT AND RADIOACTIVE SOURCES.]: Brand: PNEUMOSURE

## (undated) DEVICE — NEEDLE HYPO 21GA L1.5IN GRN POLYPR HUB S STL THN WALL IV

## (undated) DEVICE — TISSUE RETRIEVAL SYSTEM: Brand: INZII RETRIEVAL SYSTEM

## (undated) DEVICE — GLOVE SURG SZ 75 CRM LTX FREE POLYISOPRENE POLYMER BEAD ANTI

## (undated) DEVICE — Device

## (undated) DEVICE — GARMENT,MEDLINE,DVT,INT,CALF,MED, GEN2: Brand: MEDLINE

## (undated) DEVICE — BANDAGE COMPR 9 FTX4 IN SMOOTH COMFORTABLE SYNTH ESMRK LF

## (undated) DEVICE — STERILE POLYISOPRENE POWDER-FREE SURGICAL GLOVES WITH EMOLLIENT COATING: Brand: PROTEXIS

## (undated) DEVICE — LAPAROSCOPIC TROCAR SLEEVE/SINGLE USE: Brand: KII® OPTICAL ACCESS SYSTEM

## (undated) DEVICE — NEEDLE HYPO 25GA L1.5IN BLU POLYPR HUB S STL REG BVL STR

## (undated) DEVICE — SPONGE GZ W4XL4IN COT 12 PLY TYP VII WVN C FLD DSGN

## (undated) DEVICE — SOL IRR NACL 0.9% 500ML POUR --

## (undated) DEVICE — SUT MONOCRYL PLUS UD 4-0 --

## (undated) DEVICE — DERMABOND SKIN ADH 0.7ML -- DERMABOND ADVANCED 12/BX

## (undated) DEVICE — TROCARS: Brand: KII® BALLOON BLUNT TIP SYSTEM

## (undated) DEVICE — SHEARS ENDOSCP L36CM DIA5MM ULTRASONIC CRV TIP W/ ADV